# Patient Record
Sex: FEMALE | Race: WHITE | Employment: FULL TIME | URBAN - METROPOLITAN AREA
[De-identification: names, ages, dates, MRNs, and addresses within clinical notes are randomized per-mention and may not be internally consistent; named-entity substitution may affect disease eponyms.]

---

## 2021-04-04 ENCOUNTER — HOSPITAL ENCOUNTER (EMERGENCY)
Facility: CLINIC | Age: 61
Discharge: ANOTHER HOSPITAL | End: 2021-04-04
Attending: FAMILY MEDICINE | Admitting: FAMILY MEDICINE
Payer: COMMERCIAL

## 2021-04-04 ENCOUNTER — HOSPITAL ENCOUNTER (INPATIENT)
Facility: CLINIC | Age: 61
LOS: 3 days | Discharge: HOME | DRG: 247 | End: 2021-04-07
Admitting: INTERNAL MEDICINE
Payer: COMMERCIAL

## 2021-04-04 VITALS
WEIGHT: 180 LBS | DIASTOLIC BLOOD PRESSURE: 78 MMHG | BODY MASS INDEX: 28.93 KG/M2 | TEMPERATURE: 97.4 F | RESPIRATION RATE: 23 BRPM | HEIGHT: 66 IN | SYSTOLIC BLOOD PRESSURE: 138 MMHG | OXYGEN SATURATION: 98 % | HEART RATE: 97 BPM

## 2021-04-04 DIAGNOSIS — I21.02 ST ELEVATION MYOCARDIAL INFARCTION INVOLVING LEFT ANTERIOR DESCENDING (LAD) CORONARY ARTERY (H): Primary | ICD-10-CM

## 2021-04-04 DIAGNOSIS — I21.3 ST ELEVATION MI (STEMI) (H): ICD-10-CM

## 2021-04-04 DIAGNOSIS — I50.20 HEART FAILURE WITH REDUCED EJECTION FRACTION, NYHA CLASS I (H): ICD-10-CM

## 2021-04-04 DIAGNOSIS — I25.118 CORONARY ARTERY DISEASE OF NATIVE ARTERY OF NATIVE HEART WITH STABLE ANGINA PECTORIS (H): ICD-10-CM

## 2021-04-04 DIAGNOSIS — I10 ESSENTIAL HYPERTENSION: Chronic | ICD-10-CM

## 2021-04-04 DIAGNOSIS — D62 ANEMIA DUE TO BLOOD LOSS, ACUTE: ICD-10-CM

## 2021-04-04 DIAGNOSIS — Z11.52 ENCOUNTER FOR SCREENING LABORATORY TESTING FOR SEVERE ACUTE RESPIRATORY SYNDROME CORONAVIRUS 2 (SARS-COV-2): ICD-10-CM

## 2021-04-04 DIAGNOSIS — E78.5 HYPERLIPIDEMIA, UNSPECIFIED HYPERLIPIDEMIA TYPE: Chronic | ICD-10-CM

## 2021-04-04 DIAGNOSIS — I21.3 ST ELEVATION MYOCARDIAL INFARCTION (STEMI), UNSPECIFIED ARTERY (H): ICD-10-CM

## 2021-04-04 LAB
ANION GAP SERPL CALCULATED.3IONS-SCNC: 11 MMOL/L (ref 3–14)
APTT PPP: >240 SEC (ref 22–37)
BASOPHILS # BLD AUTO: 0 10E9/L (ref 0–0.2)
BASOPHILS NFR BLD AUTO: 0.3 %
BUN SERPL-MCNC: 16 MG/DL (ref 7–30)
CALCIUM SERPL-MCNC: 9.2 MG/DL (ref 8.5–10.1)
CHLORIDE SERPL-SCNC: 97 MMOL/L (ref 94–109)
CHOLEST SERPL-MCNC: 224 MG/DL
CO2 SERPL-SCNC: 22 MMOL/L (ref 20–32)
CREAT SERPL-MCNC: 0.7 MG/DL (ref 0.52–1.04)
DIFFERENTIAL METHOD BLD: ABNORMAL
EOSINOPHIL # BLD AUTO: 0.1 10E9/L (ref 0–0.7)
EOSINOPHIL NFR BLD AUTO: 0.5 %
ERYTHROCYTE [DISTWIDTH] IN BLOOD BY AUTOMATED COUNT: 12.4 % (ref 10–15)
GFR SERPL CREATININE-BSD FRML MDRD: >90 ML/MIN/{1.73_M2}
GLUCOSE BLD-MCNC: 286 MG/DL (ref 70–99)
GLUCOSE BLDC GLUCOMTR-MCNC: 276 MG/DL (ref 70–99)
GLUCOSE BLDC GLUCOMTR-MCNC: 315 MG/DL (ref 70–99)
GLUCOSE SERPL-MCNC: 306 MG/DL (ref 70–99)
HBA1C MFR BLD: 10.9 % (ref 0–5.6)
HCT VFR BLD AUTO: 41.5 % (ref 35–47)
HDLC SERPL-MCNC: 37 MG/DL
HGB BLD-MCNC: 14.7 G/DL (ref 11.7–15.7)
IMM GRANULOCYTES # BLD: 0.1 10E9/L (ref 0–0.4)
IMM GRANULOCYTES NFR BLD: 0.4 %
INR PPP: 1.17 (ref 0.86–1.14)
KCT BLD-ACNC: 172 SEC (ref 75–150)
LABORATORY COMMENT REPORT: NORMAL
LACTATE BLD-SCNC: 1.4 MMOL/L (ref 0.7–2)
LDLC SERPL CALC-MCNC: 173 MG/DL
LYMPHOCYTES # BLD AUTO: 2 10E9/L (ref 0.8–5.3)
LYMPHOCYTES NFR BLD AUTO: 17 %
MCH RBC QN AUTO: 31.5 PG (ref 26.5–33)
MCHC RBC AUTO-ENTMCNC: 35.4 G/DL (ref 31.5–36.5)
MCV RBC AUTO: 89 FL (ref 78–100)
MONOCYTES # BLD AUTO: 0.7 10E9/L (ref 0–1.3)
MONOCYTES NFR BLD AUTO: 5.6 %
NEUTROPHILS # BLD AUTO: 8.9 10E9/L (ref 1.6–8.3)
NEUTROPHILS NFR BLD AUTO: 76.2 %
NONHDLC SERPL-MCNC: 187 MG/DL
NRBC # BLD AUTO: 0 10*3/UL
NRBC BLD AUTO-RTO: 0 /100
PLATELET # BLD AUTO: 281 10E9/L (ref 150–450)
POTASSIUM SERPL-SCNC: 4.8 MMOL/L (ref 3.4–5.3)
RBC # BLD AUTO: 4.66 10E12/L (ref 3.8–5.2)
SARS-COV-2 RNA RESP QL NAA+PROBE: NEGATIVE
SODIUM SERPL-SCNC: 130 MMOL/L (ref 133–144)
SPECIMEN SOURCE: NORMAL
TRIGL SERPL-MCNC: 74 MG/DL
TROPONIN I SERPL-MCNC: 102.46 UG/L (ref 0–0.04)
TROPONIN I SERPL-MCNC: 112.73 UG/L (ref 0–0.04)
TROPONIN I SERPL-MCNC: 19.77 UG/L (ref 0–0.04)
WBC # BLD AUTO: 11.6 10E9/L (ref 4–11)

## 2021-04-04 PROCEDURE — 93010 ELECTROCARDIOGRAM REPORT: CPT | Performed by: INTERNAL MEDICINE

## 2021-04-04 PROCEDURE — 999N001017 HC STATISTIC GLUCOSE BY METER IP

## 2021-04-04 PROCEDURE — 99152 MOD SED SAME PHYS/QHP 5/>YRS: CPT | Performed by: INTERNAL MEDICINE

## 2021-04-04 PROCEDURE — 250N000012 HC RX MED GY IP 250 OP 636 PS 637: Performed by: PHYSICIAN ASSISTANT

## 2021-04-04 PROCEDURE — C1887 CATHETER, GUIDING: HCPCS | Performed by: INTERNAL MEDICINE

## 2021-04-04 PROCEDURE — 87635 SARS-COV-2 COVID-19 AMP PRB: CPT | Performed by: FAMILY MEDICINE

## 2021-04-04 PROCEDURE — 85025 COMPLETE CBC W/AUTO DIFF WBC: CPT | Performed by: FAMILY MEDICINE

## 2021-04-04 PROCEDURE — C1760 CLOSURE DEV, VASC: HCPCS | Performed by: INTERNAL MEDICINE

## 2021-04-04 PROCEDURE — 93454 CORONARY ARTERY ANGIO S&I: CPT | Performed by: INTERNAL MEDICINE

## 2021-04-04 PROCEDURE — 99221 1ST HOSP IP/OBS SF/LOW 40: CPT | Mod: 25 | Performed by: PHYSICIAN ASSISTANT

## 2021-04-04 PROCEDURE — 250N000011 HC RX IP 250 OP 636: Performed by: INTERNAL MEDICINE

## 2021-04-04 PROCEDURE — C1769 GUIDE WIRE: HCPCS | Performed by: INTERNAL MEDICINE

## 2021-04-04 PROCEDURE — 93005 ELECTROCARDIOGRAM TRACING: CPT | Performed by: FAMILY MEDICINE

## 2021-04-04 PROCEDURE — 83605 ASSAY OF LACTIC ACID: CPT

## 2021-04-04 PROCEDURE — 93005 ELECTROCARDIOGRAM TRACING: CPT

## 2021-04-04 PROCEDURE — 96374 THER/PROPH/DIAG INJ IV PUSH: CPT | Performed by: FAMILY MEDICINE

## 2021-04-04 PROCEDURE — 99291 CRITICAL CARE FIRST HOUR: CPT | Mod: 25 | Performed by: FAMILY MEDICINE

## 2021-04-04 PROCEDURE — 85610 PROTHROMBIN TIME: CPT | Performed by: FAMILY MEDICINE

## 2021-04-04 PROCEDURE — 80061 LIPID PANEL: CPT | Performed by: PHYSICIAN ASSISTANT

## 2021-04-04 PROCEDURE — 027034Z DILATION OF CORONARY ARTERY, ONE ARTERY WITH DRUG-ELUTING INTRALUMINAL DEVICE, PERCUTANEOUS APPROACH: ICD-10-PCS | Performed by: INTERNAL MEDICINE

## 2021-04-04 PROCEDURE — 250N000013 HC RX MED GY IP 250 OP 250 PS 637: Performed by: FAMILY MEDICINE

## 2021-04-04 PROCEDURE — 250N000009 HC RX 250: Performed by: INTERNAL MEDICINE

## 2021-04-04 PROCEDURE — 85730 THROMBOPLASTIN TIME PARTIAL: CPT | Performed by: FAMILY MEDICINE

## 2021-04-04 PROCEDURE — B2111ZZ FLUOROSCOPY OF MULTIPLE CORONARY ARTERIES USING LOW OSMOLAR CONTRAST: ICD-10-PCS | Performed by: INTERNAL MEDICINE

## 2021-04-04 PROCEDURE — 84484 ASSAY OF TROPONIN QUANT: CPT | Performed by: PHYSICIAN ASSISTANT

## 2021-04-04 PROCEDURE — 36415 COLL VENOUS BLD VENIPUNCTURE: CPT | Performed by: PHYSICIAN ASSISTANT

## 2021-04-04 PROCEDURE — 250N000013 HC RX MED GY IP 250 OP 250 PS 637: Performed by: PHYSICIAN ASSISTANT

## 2021-04-04 PROCEDURE — 99153 MOD SED SAME PHYS/QHP EA: CPT | Performed by: INTERNAL MEDICINE

## 2021-04-04 PROCEDURE — 80048 BASIC METABOLIC PNL TOTAL CA: CPT | Performed by: FAMILY MEDICINE

## 2021-04-04 PROCEDURE — 93010 ELECTROCARDIOGRAM REPORT: CPT | Performed by: FAMILY MEDICINE

## 2021-04-04 PROCEDURE — 272N000001 HC OR GENERAL SUPPLY STERILE: Performed by: INTERNAL MEDICINE

## 2021-04-04 PROCEDURE — 85347 COAGULATION TIME ACTIVATED: CPT

## 2021-04-04 PROCEDURE — 82947 ASSAY GLUCOSE BLOOD QUANT: CPT

## 2021-04-04 PROCEDURE — 83036 HEMOGLOBIN GLYCOSYLATED A1C: CPT | Performed by: PHYSICIAN ASSISTANT

## 2021-04-04 PROCEDURE — C9803 HOPD COVID-19 SPEC COLLECT: HCPCS | Performed by: FAMILY MEDICINE

## 2021-04-04 PROCEDURE — 250N000011 HC RX IP 250 OP 636: Performed by: FAMILY MEDICINE

## 2021-04-04 PROCEDURE — C1757 CATH, THROMBECTOMY/EMBOLECT: HCPCS | Performed by: INTERNAL MEDICINE

## 2021-04-04 PROCEDURE — C9606 PERC D-E COR REVASC W AMI S: HCPCS | Mod: LD | Performed by: INTERNAL MEDICINE

## 2021-04-04 PROCEDURE — 250N000011 HC RX IP 250 OP 636: Performed by: PHYSICIAN ASSISTANT

## 2021-04-04 PROCEDURE — C1894 INTRO/SHEATH, NON-LASER: HCPCS | Performed by: INTERNAL MEDICINE

## 2021-04-04 PROCEDURE — C1725 CATH, TRANSLUMIN NON-LASER: HCPCS | Performed by: INTERNAL MEDICINE

## 2021-04-04 PROCEDURE — 84484 ASSAY OF TROPONIN QUANT: CPT | Performed by: FAMILY MEDICINE

## 2021-04-04 PROCEDURE — 99285 EMERGENCY DEPT VISIT HI MDM: CPT | Mod: 25 | Performed by: FAMILY MEDICINE

## 2021-04-04 PROCEDURE — 200N000002 HC R&B ICU UMMC

## 2021-04-04 PROCEDURE — C1874 STENT, COATED/COV W/DEL SYS: HCPCS | Performed by: INTERNAL MEDICINE

## 2021-04-04 DEVICE — STENT SYNERGY DRUG ELUTING 2.75X28MM  H7493926028270: Type: IMPLANTABLE DEVICE | Status: FUNCTIONAL

## 2021-04-04 RX ORDER — LIDOCAINE 40 MG/G
CREAM TOPICAL
Status: DISCONTINUED | OUTPATIENT
Start: 2021-04-04 | End: 2021-04-07 | Stop reason: HOSPADM

## 2021-04-04 RX ORDER — LOSARTAN POTASSIUM 100 MG/1
100 TABLET ORAL DAILY
Status: ON HOLD | COMMUNITY
Start: 2020-09-12 | End: 2021-04-07

## 2021-04-04 RX ORDER — HYDROCHLOROTHIAZIDE 25 MG/1
25 TABLET ORAL DAILY
Status: ON HOLD | COMMUNITY
Start: 2020-09-12 | End: 2021-04-07

## 2021-04-04 RX ORDER — FENTANYL CITRATE 50 UG/ML
25-50 INJECTION, SOLUTION INTRAMUSCULAR; INTRAVENOUS
Status: ACTIVE | OUTPATIENT
Start: 2021-04-04 | End: 2021-04-04

## 2021-04-04 RX ORDER — ACETAMINOPHEN 325 MG/1
650 TABLET ORAL EVERY 4 HOURS PRN
Status: DISCONTINUED | OUTPATIENT
Start: 2021-04-04 | End: 2021-04-07 | Stop reason: HOSPADM

## 2021-04-04 RX ORDER — NALOXONE HYDROCHLORIDE 0.4 MG/ML
0.2 INJECTION, SOLUTION INTRAMUSCULAR; INTRAVENOUS; SUBCUTANEOUS
Status: DISCONTINUED | OUTPATIENT
Start: 2021-04-04 | End: 2021-04-07 | Stop reason: HOSPADM

## 2021-04-04 RX ORDER — ATORVASTATIN CALCIUM 40 MG/1
40 TABLET, FILM COATED ORAL EVERY EVENING
Status: DISCONTINUED | OUTPATIENT
Start: 2021-04-04 | End: 2021-04-07 | Stop reason: HOSPADM

## 2021-04-04 RX ORDER — ONDANSETRON 4 MG/1
4 TABLET, ORALLY DISINTEGRATING ORAL EVERY 6 HOURS PRN
Status: DISCONTINUED | OUTPATIENT
Start: 2021-04-04 | End: 2021-04-04

## 2021-04-04 RX ORDER — METOPROLOL TARTRATE 1 MG/ML
5-10 INJECTION, SOLUTION INTRAVENOUS
Status: DISCONTINUED | OUTPATIENT
Start: 2021-04-04 | End: 2021-04-05

## 2021-04-04 RX ORDER — HYDROCHLOROTHIAZIDE 25 MG/1
25 TABLET ORAL DAILY
Status: DISCONTINUED | OUTPATIENT
Start: 2021-04-05 | End: 2021-04-05

## 2021-04-04 RX ORDER — DEXTROSE MONOHYDRATE 25 G/50ML
25-50 INJECTION, SOLUTION INTRAVENOUS
Status: DISCONTINUED | OUTPATIENT
Start: 2021-04-04 | End: 2021-04-07 | Stop reason: HOSPADM

## 2021-04-04 RX ORDER — HEPARIN SODIUM 1000 [USP'U]/ML
INJECTION, SOLUTION INTRAVENOUS; SUBCUTANEOUS
Status: DISCONTINUED | OUTPATIENT
Start: 2021-04-04 | End: 2021-04-04 | Stop reason: HOSPADM

## 2021-04-04 RX ORDER — ASPIRIN 81 MG/1
81 TABLET, CHEWABLE ORAL ONCE
Status: DISCONTINUED | OUTPATIENT
Start: 2021-04-04 | End: 2021-04-04

## 2021-04-04 RX ORDER — ASPIRIN 81 MG/1
324 TABLET, CHEWABLE ORAL ONCE
Status: COMPLETED | OUTPATIENT
Start: 2021-04-04 | End: 2021-04-04

## 2021-04-04 RX ORDER — HYDRALAZINE HYDROCHLORIDE 20 MG/ML
10 INJECTION INTRAMUSCULAR; INTRAVENOUS EVERY 4 HOURS PRN
Status: DISCONTINUED | OUTPATIENT
Start: 2021-04-04 | End: 2021-04-04

## 2021-04-04 RX ORDER — INSULIN GLARGINE 100 [IU]/ML
18 INJECTION, SOLUTION SUBCUTANEOUS AT BEDTIME
COMMUNITY
Start: 2020-11-19

## 2021-04-04 RX ORDER — FLUMAZENIL 0.1 MG/ML
0.2 INJECTION, SOLUTION INTRAVENOUS
Status: DISCONTINUED | OUTPATIENT
Start: 2021-04-04 | End: 2021-04-05

## 2021-04-04 RX ORDER — FENTANYL CITRATE 50 UG/ML
INJECTION, SOLUTION INTRAMUSCULAR; INTRAVENOUS
Status: DISCONTINUED | OUTPATIENT
Start: 2021-04-04 | End: 2021-04-04 | Stop reason: HOSPADM

## 2021-04-04 RX ORDER — FENTANYL CITRATE-0.9 % NACL/PF 10 MCG/ML
PLASTIC BAG, INJECTION (ML) INTRAVENOUS
Status: DISCONTINUED | OUTPATIENT
Start: 2021-04-04 | End: 2021-04-04 | Stop reason: HOSPADM

## 2021-04-04 RX ORDER — NALOXONE HYDROCHLORIDE 0.4 MG/ML
0.4 INJECTION, SOLUTION INTRAMUSCULAR; INTRAVENOUS; SUBCUTANEOUS
Status: DISCONTINUED | OUTPATIENT
Start: 2021-04-04 | End: 2021-04-07 | Stop reason: HOSPADM

## 2021-04-04 RX ORDER — ONDANSETRON 2 MG/ML
4 INJECTION INTRAMUSCULAR; INTRAVENOUS EVERY 6 HOURS PRN
Status: DISCONTINUED | OUTPATIENT
Start: 2021-04-04 | End: 2021-04-07 | Stop reason: HOSPADM

## 2021-04-04 RX ORDER — IOPAMIDOL 755 MG/ML
INJECTION, SOLUTION INTRAVASCULAR
Status: DISCONTINUED | OUTPATIENT
Start: 2021-04-04 | End: 2021-04-04 | Stop reason: HOSPADM

## 2021-04-04 RX ORDER — NICARDIPINE HYDROCHLORIDE 2.5 MG/ML
INJECTION INTRAVENOUS
Status: DISCONTINUED | OUTPATIENT
Start: 2021-04-04 | End: 2021-04-04 | Stop reason: HOSPADM

## 2021-04-04 RX ORDER — SODIUM CHLORIDE 9 MG/ML
INJECTION, SOLUTION INTRAVENOUS CONTINUOUS
Status: ACTIVE | OUTPATIENT
Start: 2021-04-04 | End: 2021-04-04

## 2021-04-04 RX ORDER — HYDRALAZINE HYDROCHLORIDE 20 MG/ML
5 INJECTION INTRAMUSCULAR; INTRAVENOUS EVERY 4 HOURS PRN
Status: DISCONTINUED | OUTPATIENT
Start: 2021-04-04 | End: 2021-04-05

## 2021-04-04 RX ORDER — NITROGLYCERIN 0.4 MG/1
0.4 TABLET SUBLINGUAL EVERY 5 MIN PRN
Status: DISCONTINUED | OUTPATIENT
Start: 2021-04-04 | End: 2021-04-07 | Stop reason: HOSPADM

## 2021-04-04 RX ORDER — ATROPINE SULFATE 0.1 MG/ML
0.5 INJECTION INTRAVENOUS
Status: DISCONTINUED | OUTPATIENT
Start: 2021-04-04 | End: 2021-04-05

## 2021-04-04 RX ORDER — PRAVASTATIN SODIUM 80 MG/1
80 TABLET ORAL DAILY
Status: ON HOLD | COMMUNITY
Start: 2019-12-31 | End: 2021-04-05

## 2021-04-04 RX ORDER — ONDANSETRON 2 MG/ML
4 INJECTION INTRAMUSCULAR; INTRAVENOUS EVERY 6 HOURS PRN
Status: DISCONTINUED | OUTPATIENT
Start: 2021-04-04 | End: 2021-04-04

## 2021-04-04 RX ORDER — ONDANSETRON 4 MG/1
4 TABLET, ORALLY DISINTEGRATING ORAL EVERY 6 HOURS PRN
Status: DISCONTINUED | OUTPATIENT
Start: 2021-04-04 | End: 2021-04-07 | Stop reason: HOSPADM

## 2021-04-04 RX ORDER — ASPIRIN 81 MG/1
81 TABLET ORAL DAILY
Status: DISCONTINUED | OUTPATIENT
Start: 2021-04-05 | End: 2021-04-07 | Stop reason: HOSPADM

## 2021-04-04 RX ORDER — NICOTINE POLACRILEX 4 MG
15-30 LOZENGE BUCCAL
Status: DISCONTINUED | OUTPATIENT
Start: 2021-04-04 | End: 2021-04-07 | Stop reason: HOSPADM

## 2021-04-04 RX ADMIN — HEPARIN SODIUM 5500 UNITS: 1000 INJECTION INTRAVENOUS; SUBCUTANEOUS at 13:20

## 2021-04-04 RX ADMIN — INSULIN ASPART 6 UNITS: 100 INJECTION, SOLUTION INTRAVENOUS; SUBCUTANEOUS at 17:31

## 2021-04-04 RX ADMIN — ONDANSETRON 4 MG: 2 INJECTION INTRAMUSCULAR; INTRAVENOUS at 15:32

## 2021-04-04 RX ADMIN — ATORVASTATIN CALCIUM 40 MG: 40 TABLET, FILM COATED ORAL at 20:04

## 2021-04-04 RX ADMIN — Medication 12.5 MG: at 20:05

## 2021-04-04 RX ADMIN — INSULIN GLARGINE 9 UNITS: 100 INJECTION, SOLUTION SUBCUTANEOUS at 22:17

## 2021-04-04 RX ADMIN — TICAGRELOR 180 MG: 90 TABLET ORAL at 13:19

## 2021-04-04 RX ADMIN — HEPARIN SODIUM 5500 UNITS: 1000 INJECTION INTRAVENOUS; SUBCUTANEOUS at 13:24

## 2021-04-04 RX ADMIN — ACETAMINOPHEN 650 MG: 325 TABLET, FILM COATED ORAL at 22:14

## 2021-04-04 RX ADMIN — ASPIRIN 81 MG CHEWABLE TABLET 324 MG: 81 TABLET CHEWABLE at 13:18

## 2021-04-04 RX ADMIN — TICAGRELOR 90 MG: 90 TABLET ORAL at 20:05

## 2021-04-04 ASSESSMENT — ACTIVITIES OF DAILY LIVING (ADL)
WEAR_GLASSES_OR_BLIND: YES
ADLS_ACUITY_SCORE: 16
ADLS_ACUITY_SCORE: 16

## 2021-04-04 ASSESSMENT — MIFFLIN-ST. JEOR: SCORE: 1388.22

## 2021-04-04 NOTE — Clinical Note
Stent deployed in the middle left anterior descending. Max pressure = 12 vianca. Total duration = 2 seconds.

## 2021-04-04 NOTE — H&P
[facility]   History and Physical  Cardiology - [facility]       Date of Admission:  4/4/2021  Date of Service: 4/4/2021     Primary Care Physician   No Ref-Primary, Physician None    Assessment & Plan   Nighat Reyes is a 61 year old female with PMH significant for PDA (fixed surgically), T2DM, HTN, HLD who now presents with STEMI.    # Late presentation MI  Chest pain onset 4/3.  Episode of nausea 4/4 prompting ED visit.  Upon arrival, found to have ST segment elevation of leads V2-V6.  STEMI activation called for [facility]. Upon arrival to [facility], patient was chest pain free.  Coronary angiogram preformed which showed occlusion of her LAD. She has residual disease of her mRCA.  - cardiac rehab referral placed  - bedrest per protocol  - goal directed medical therapy   - DAPT: 81mg ASA, 90mg Brilinta BID   - statin: stop pravastatin, start 40mg atorvastatin daily   - BB: start 12.5mg metoprolol BID   - ACE/ARB: intolerant of lisinopril. Hold PTA losartan given recent contrast, 100mg daily. Start in the coming days  - will need mRCA intervention prior to discharge.    # HTN  BP, controlled  - continue HCTZ  - hold PTA losartan given contrast dye  - start BB as above  - PRN hydralazine ordered with parameters    # HLD  - statin as above    # T2DM  Outpatient regimen: 100mg januvia BID, 18u basalar daily, 1000mg metformin BID  - give half dose lantus this evening given poor PO status with recent coronary angiogram; 9u this evening  - high sliding scale insulin with hyper/hypoglycemia protocol  - hold PTA metformin  - hold PTA januvia    # Hx of PDA s/p surgical repair    F: not indicated  E: BMP s/p coronary angiogram  N: advance as tolerated s/p angiogram  DVT Prophylaxis: hold Lovenox today. SCDs today.  possible Lovenox tomorrow    Code Status: Full Code    Disposition: Anticipate discharge in 2-3 days. Appropriate for inpatient  Care.    Case discussed with Dr. Goldberg  "Erica.  Assessment and plan as written above.    Ignacia Zarate PA-C  [facility] Cardiology    History is obtained from the patient and review of the EMR.    Past Medical History    See above    Past Surgical History   Non-contributory    Family History    Non-contributory.    History of Present Illness   Nighat Reyes is a 61 year old female with PMH significant for PDA (fixed surgically), T2DM, HTN, HLD who now presents with STEMI.    Patient is a 61-year-old female who at approximately 1 PM yesterday afternoon began to note onset of left sided/central chest pain.  She describes this as a squeezing pain that nothing seemed to make this worse.  The patient felt that the pain had mildly improved after rest.  The patient notes that with onset of pain, its intensity was approximately a 7 out of 10.  She notes that throughout the day the pain was not \"as severe\" the patient had an evening of poor sleep.  She awoke this morning and had persistent chest pain.  At approximately 11 AM, while with her family for [employer] lunch, she had 1 episode of emesis.  She discussed her recent symptoms with her daughter-in-law whom is a registered nurse.  She urged the patient to present to the emergency department for evaluation of acute coronary syndrome.    Upon arrival to [facility], the patient underwent immediate EKG.  She was found to have ST segment elevation of V2 through V6.  STEMI activation was called.  The patient was emergently transferred to [facility].  She underwent coronary angiogram which showed 100% occlusion of her proximal LAD.  This was opened and a stent was placed.  The patient has residual disease of her right coronary artery that will require intervention.  At present, she denies chest pain, shortness of breath, right groin pain, and lightheadedness/dizziness.  She has mild nausea.      ROS: In the last two weeks, the patient otherwise denies fever, chills, cold-like symptoms (congestion, pharyngitis, " sinus pressure, rhinorrhea), headaches, lightheadedness, dizziness, palpitations/flutters, SOB, wheezes, abdominal pain, diarrhea/constipation, dysuria, hematuria, joint swelling, joint pain, leg swelling, and rashes. The patient has had a mild cough over the last several week which she attributes to allergies.      Prior to Admission Medications   Prior to Admission Medications   Prescriptions Last Dose Informant Patient Reported? Taking?   hydrochlorothiazide (HYDRODIURIL) 25 MG tablet   Yes Yes   Sig: Take 25 mg by mouth daily   insulin glargine (BASAGLAR KWIKPEN) 100 UNIT/ML pen   Yes Yes   Sig: Inject 18 Units Subcutaneous At Bedtime   losartan (COZAAR) 100 MG tablet   Yes Yes   Sig: Take 100 mg by mouth daily   metFORMIN (GLUCOPHAGE) 1000 MG tablet   Yes Yes   Sig: Take 1,000 mg by mouth 2 times daily (with meals)   pravastatin (PRAVACHOL) 80 MG tablet   Yes Yes   Sig: Take 80 mg by mouth daily   sitagliptin (JANUVIA) 100 MG tablet   Yes Yes   Sig: Take 100 mg by mouth daily      Facility-Administered Medications: None       Allergies   Allergies   Allergen Reactions     Lisinopril        Social History   Social History     Socioeconomic History     Marital status:      Spouse name: Not on file     Number of children: Not on file     Years of education: Not on file     Highest education level: Not on file   Occupational History     Not on file   Social Needs     Financial resource strain: Not on file     Food insecurity     Worry: Not on file     Inability: Not on file     Transportation needs     Medical: Not on file     Non-medical: Not on file   Tobacco Use     Smoking status: Not on file   Substance and Sexual Activity     Alcohol use: Not on file     Drug use: Not on file     Sexual activity: Not on file   Lifestyle     Physical activity     Days per week: Not on file     Minutes per session: Not on file     Stress: Not on file   Relationships     Social connections     Talks on phone: Not on file      Gets together: Not on file     Attends [employer] service: Not on file     Active member of club or organization: Not on file     Attends meetings of clubs or organizations: Not on file     Relationship status: Not on file     Intimate partner violence     Fear of current or ex partner: Not on file     Emotionally abused: Not on file     Physically abused: Not on file     Forced sexual activity: Not on file   Other Topics Concern     Not on file   Social History Narrative     Not on file     Physical Exam   Resp 18    Weight: 0 lbs 0 oz There is no height or weight on file to calculate BMI.     EKG:      ECHO:  Pending    Procedure:  Coronary angiogram final read pending.  100% occlusion of pLAD.    Constitutional: Alert and oriented x4.  Cooperative.  Appears stated age.  Appears in no caute distress.   HEENT: Oropharynx is clear and moist. No evidence of cranial trauma.  Lymph/Hematologic: No occipital, submental, submandibular, anterior or posterior cervical, or supraclavicular lymphadenopathy is appreciated.  Cardiovascular: Regular rate/ rhythm.  S1 and S2 grossly normal.  No appreciable murmur, rub, gallop.  JVP is not elevated.  No lower extremity edema.  Right Groin: clean, dry, intact. No appreciable hematoma  Respiratory: Clear to auscultation bilaterally. Equal chest expansion.  GI: Soft, non-tender, normal bowel sounds, no hepatosplenomegaly.  Genitourinary: Deferred  Musculoskeletal: Normal muscle bulk and tone.  Skin: Warm and dry, no rashes.   Neurologic: Neck supple. Cranial nerves 3-12 are grossly intact.  is symmetric.     Data   Recent Labs   Lab 04/04/21  1433 04/04/21  1335 04/04/21  1317   WBC  --   --  11.6*   HGB  --   --  14.7   MCV  --   --  89   PLT  --   --  281   INR  --  1.17*  --    NA  --   --  130*   POTASSIUM  --   --  4.8   CHLORIDE  --   --  97   CO2  --   --  22   BUN  --   --  16   CR  --   --  0.70   ANIONGAP  --   --  11   LEYLA  --   --  9.2   *  --  306*   TROPI   --   --  19.858*     Ignacia Zarate PA-C  Cardiology - [facility]       I have seen and examined the patient with the [facility] team. I agree with the assessment and plan of the note above.I have reviewed pertinent labs.     Yusuf Maldonado MD  Interventional Cardiology  Pager: [phone]

## 2021-04-04 NOTE — PLAN OF CARE
Admitted/transferred from: Cath lab  Reason for admission/transfer: Post PCI  2 RN skin assessment: completed by Bernice Vance and Tequila Zamora  Result of skin assessment and interventions/actions: Encouraged mobility  Height, weight, drug calc weight: done  Patient belongings: with   MDRO education added to care plan done  ?     Major Shift Events: Arrived to unit at 1500 on 2L NC. Left groin site soft, no hematoma, no bleeding. Off bedrest at 1700; pt got up to toilet to void, tolerating well and now in chair.    Plan: Monitor closely and continue plan of care.    For vital signs and complete assessments, please see documentation flowsheets.

## 2021-04-04 NOTE — ED PROVIDER NOTES
HPI   The patient is a 61-year-old female presenting with chest pain.  She has a known history of patent ductus arteriosus which was fixed surgically a number of years ago.  She denies other cardiac pathology.  She denies pulmonary pathology.  She does smoke.  No chewing tobacco.  She drinks alcohol about 3 beers once a week.  No drugs of abuse.    The patient reports onset of pain since yesterday.  She has had intermittent episodes of pain since that time.  It is felt in the left chest.  Today her pain started as she was waking up at about 8:30 AM.  Her pain is been constant today.  She rates the pain 3/10.  She points toward her left chest.  She denies radiating symptoms.  She denies diaphoresis and shortness of breath.  She does report nausea and throwing up about an hour ago.  No leg pain or swelling.  No cough or congestion.  No fever.  She has not had pain like this previously.        Allergies:  Allergies   Allergen Reactions     Lisinopril      Problem List:    Patient Active Problem List    Diagnosis Date Noted     ST elevation myocardial infarction involving left anterior descending (LAD) coronary artery (H) 04/04/2021     Priority: Medium     Diabetes mellitus, type II (H) 04/14/2006     Priority: Medium     Formatting of this note might be different from the original.  Diagnosed age mid 40s.       Essential hypertension 04/14/2006     Priority: Medium     Hyperlipidemia 04/14/2006     Priority: Medium      Past Medical History:    No past medical history on file.  Past Surgical History:    No past surgical history on file.  Family History:    No family history on file.  Social History:  Marital Status:   [2]  Social History     Tobacco Use     Smoking status: Not on file   Substance Use Topics     Alcohol use: Not on file     Drug use: Not on file      Medications:    No current outpatient medications on file.    Review of Systems   All other systems reviewed and are negative.      PE   BP: (!)  "147/99  Pulse: 105  Temp: 97.4  F (36.3  C)  Resp: 18  Height: 167.6 cm (5' 6\")  Weight: 81.6 kg (180 lb)  SpO2: 99 %  Physical Exam  Vitals signs reviewed.   Constitutional:       General: She is not in acute distress.     Appearance: She is well-developed.      Comments: Cooperative, no apparent distress.  Answering questions well.   HENT:      Head: Normocephalic and atraumatic.      Right Ear: External ear normal.      Left Ear: External ear normal.      Nose: Nose normal.      Mouth/Throat:      Mouth: Mucous membranes are moist.      Pharynx: Oropharynx is clear.   Eyes:      Extraocular Movements: Extraocular movements intact.      Conjunctiva/sclera: Conjunctivae normal.      Pupils: Pupils are equal, round, and reactive to light.   Neck:      Musculoskeletal: Normal range of motion.   Cardiovascular:      Rate and Rhythm: Normal rate and regular rhythm.      Heart sounds: Normal heart sounds.   Pulmonary:      Effort: Pulmonary effort is normal.      Breath sounds: Normal breath sounds.   Abdominal:      Palpations: Abdomen is soft.      Tenderness: There is no abdominal tenderness.   Musculoskeletal: Normal range of motion.   Skin:     General: Skin is warm and dry.   Neurological:      Mental Status: She is alert and oriented to person, place, and time.   Psychiatric:         Behavior: Behavior normal.         ED COURSE and [facility]   1322.  Patient presents with chest pain and recent nausea with vomiting.  Her EKG is concerning for an anterior lateral STEMI.  STEMI activation ordered.  Lab values pending.  I just spoke with  at the [facility].  He recommended using the STEMI protocol which has been ordered already.  No additional orders requested.  The patient will be transferred code 3.  Patient agrees with the plan.    EKG  (1309)   Interpretation performed by me.  Rate: 101     Rhythm: sinus     Axis: L  Intervals: TN (12-2) 132, QRS (<12) 128, QTc (>5) 443  P wave: down in V1.     " QRS complex: Incomplete right bundle branch block with left axis anterior fascicular block.  ST segment / T-wave: ST elevation 2 boxes in leads I and aVL, ST elevation 3 boxes in lead V2, 1 box in lead V3, half a box in lead V4 and V5.  Conclusion: STEMI, anterior lateral distribution likely.  Sinus tachycardia.  Possible left atrial enlargement.    LABS  Labs Ordered and Resulted from Time of ED Arrival Up to the Time of Departure from the ED   CBC WITH PLATELETS DIFFERENTIAL - Abnormal; Notable for the following components:       Result Value    WBC 11.6 (*)     Absolute Neutrophil 8.9 (*)     All other components within normal limits   INR   PARTIAL THROMBOPLASTIN TIME   BASIC METABOLIC PANEL   TROPONIN I   SARS-COV-2 (COVID-19) VIRUS RT-PCR   PULSE OXIMETRY NURSING   CARDIAC CONTINUOUS MONITORING   PERIPHERAL IV CATHETER   PATIENT CARE ORDER       IMAGING  Images reviewed by me.  Radiology report also reviewed.  No orders to display       Procedures    Medications   aspirin (ASA) chewable tablet 324 mg (324 mg Oral Given 4/4/21 1318)   ticagrelor (BRILINTA) tablet 180 mg (180 mg Oral Given 4/4/21 1319)   heparin ANTICOAGULANT (porcine) injection (LOADING DOSE) (5,500 Units Intravenous Given 4/4/21 1324)         IMPRESSION       ICD-10-CM    1. ST elevation myocardial infarction (STEMI), unspecified artery (H)  I21.3 CBC with platelets differential            Medication List      There are no discharge medications for this visit.               The patient has a STEMI     The patient was evaluated at 1320   Cath lab transfer at [facility] for cardiac intervention   ASA given in the ER    Critical Care Documentation  My initial assessment included review of prehospital provider report, review of nursing observations, review of vital signs, focused history, physical exam, review of cardiac rhythm monitor, 12 lead ECG analysis and discussion with Roger.  It was determined that the patient had a STEMI. This required  immediate intervention and critical care decision-making.     Critical care time: 72 minutes.                        Jak Sanchez MD  04/04/21 [id number]

## 2021-04-04 NOTE — Clinical Note
The first balloon was inserted into the left anterior descending and middle left anterior descending.Max pressure = 12 vianca. Total duration = 3 seconds.     Max pressure = 12 vianca. Total duration = 3 seconds.    Balloon reinflated a second time: Max pressure = 12 vianca. Total duration = 3 seconds.

## 2021-04-05 ENCOUNTER — APPOINTMENT (OUTPATIENT)
Dept: CT IMAGING | Facility: CLINIC | Age: 61
DRG: 247 | End: 2021-04-05
Attending: INTERNAL MEDICINE
Payer: COMMERCIAL

## 2021-04-05 ENCOUNTER — APPOINTMENT (OUTPATIENT)
Dept: CARDIOLOGY | Facility: CLINIC | Age: 61
DRG: 247 | End: 2021-04-05
Attending: PHYSICIAN ASSISTANT
Payer: COMMERCIAL

## 2021-04-05 ENCOUNTER — APPOINTMENT (OUTPATIENT)
Dept: CARDIOLOGY | Facility: CLINIC | Age: 61
DRG: 247 | End: 2021-04-05
Attending: INTERNAL MEDICINE
Payer: COMMERCIAL

## 2021-04-05 LAB
ANION GAP SERPL CALCULATED.3IONS-SCNC: 8 MMOL/L (ref 3–14)
APTT PPP: 28 SEC (ref 22–37)
BUN SERPL-MCNC: 21 MG/DL (ref 7–30)
CALCIUM SERPL-MCNC: 8.4 MG/DL (ref 8.5–10.1)
CHLORIDE SERPL-SCNC: 98 MMOL/L (ref 94–109)
CO2 SERPL-SCNC: 24 MMOL/L (ref 20–32)
CREAT SERPL-MCNC: 0.78 MG/DL (ref 0.52–1.04)
ERYTHROCYTE [DISTWIDTH] IN BLOOD BY AUTOMATED COUNT: 12.7 % (ref 10–15)
ERYTHROCYTE [DISTWIDTH] IN BLOOD BY AUTOMATED COUNT: 12.9 % (ref 10–15)
GFR SERPL CREATININE-BSD FRML MDRD: 81 ML/MIN/{1.73_M2}
GLUCOSE BLDC GLUCOMTR-MCNC: 194 MG/DL (ref 70–99)
GLUCOSE BLDC GLUCOMTR-MCNC: 198 MG/DL (ref 70–99)
GLUCOSE BLDC GLUCOMTR-MCNC: 219 MG/DL (ref 70–99)
GLUCOSE BLDC GLUCOMTR-MCNC: 250 MG/DL (ref 70–99)
GLUCOSE BLDC GLUCOMTR-MCNC: 305 MG/DL (ref 70–99)
GLUCOSE SERPL-MCNC: 208 MG/DL (ref 70–99)
HCT VFR BLD AUTO: 31.8 % (ref 35–47)
HCT VFR BLD AUTO: 34.4 % (ref 35–47)
HGB BLD-MCNC: 10.6 G/DL (ref 11.7–15.7)
HGB BLD-MCNC: 10.8 G/DL (ref 11.7–15.7)
HGB BLD-MCNC: 10.9 G/DL (ref 11.7–15.7)
HGB BLD-MCNC: 11.9 G/DL (ref 11.7–15.7)
INR PPP: 1.04 (ref 0.86–1.14)
INTERPRETATION ECG - MUSE: NORMAL
MCH RBC QN AUTO: 30 PG (ref 26.5–33)
MCH RBC QN AUTO: 31.4 PG (ref 26.5–33)
MCHC RBC AUTO-ENTMCNC: 34 G/DL (ref 31.5–36.5)
MCHC RBC AUTO-ENTMCNC: 34.6 G/DL (ref 31.5–36.5)
MCV RBC AUTO: 88 FL (ref 78–100)
MCV RBC AUTO: 91 FL (ref 78–100)
PLATELET # BLD AUTO: 211 10E9/L (ref 150–450)
PLATELET # BLD AUTO: 212 10E9/L (ref 150–450)
POTASSIUM SERPL-SCNC: 3.5 MMOL/L (ref 3.4–5.3)
POTASSIUM SERPL-SCNC: 3.9 MMOL/L (ref 3.4–5.3)
RBC # BLD AUTO: 3.6 10E12/L (ref 3.8–5.2)
RBC # BLD AUTO: 3.79 10E12/L (ref 3.8–5.2)
SODIUM SERPL-SCNC: 130 MMOL/L (ref 133–144)
TROPONIN I SERPL-MCNC: 41.15 UG/L (ref 0–0.04)
TROPONIN I SERPL-MCNC: 70.95 UG/L (ref 0–0.04)
UFH PPP CHRO-ACNC: 1.1 IU/ML
UFH PPP CHRO-ACNC: >1.1 IU/ML
WBC # BLD AUTO: 10.1 10E9/L (ref 4–11)
WBC # BLD AUTO: 9.5 10E9/L (ref 4–11)

## 2021-04-05 PROCEDURE — 85018 HEMOGLOBIN: CPT | Performed by: STUDENT IN AN ORGANIZED HEALTH CARE EDUCATION/TRAINING PROGRAM

## 2021-04-05 PROCEDURE — 74176 CT ABD & PELVIS W/O CONTRAST: CPT | Mod: 26

## 2021-04-05 PROCEDURE — 99233 SBSQ HOSP IP/OBS HIGH 50: CPT | Mod: 25 | Performed by: INTERNAL MEDICINE

## 2021-04-05 PROCEDURE — 93321 DOPPLER ECHO F-UP/LMTD STD: CPT

## 2021-04-05 PROCEDURE — 36415 COLL VENOUS BLD VENIPUNCTURE: CPT | Performed by: INTERNAL MEDICINE

## 2021-04-05 PROCEDURE — 74176 CT ABD & PELVIS W/O CONTRAST: CPT

## 2021-04-05 PROCEDURE — 120N000002 HC R&B MED SURG/OB UMMC

## 2021-04-05 PROCEDURE — 36415 COLL VENOUS BLD VENIPUNCTURE: CPT | Performed by: PHYSICIAN ASSISTANT

## 2021-04-05 PROCEDURE — 250N000011 HC RX IP 250 OP 636: Performed by: PHYSICIAN ASSISTANT

## 2021-04-05 PROCEDURE — 36415 COLL VENOUS BLD VENIPUNCTURE: CPT

## 2021-04-05 PROCEDURE — 85027 COMPLETE CBC AUTOMATED: CPT | Performed by: PHYSICIAN ASSISTANT

## 2021-04-05 PROCEDURE — 999N000208 ECHOCARDIOGRAM COMPLETE

## 2021-04-05 PROCEDURE — 93308 TTE F-UP OR LMTD: CPT | Mod: 26 | Performed by: INTERNAL MEDICINE

## 2021-04-05 PROCEDURE — 93306 TTE W/DOPPLER COMPLETE: CPT | Mod: 26 | Performed by: INTERNAL MEDICINE

## 2021-04-05 PROCEDURE — 84132 ASSAY OF SERUM POTASSIUM: CPT | Performed by: PHYSICIAN ASSISTANT

## 2021-04-05 PROCEDURE — 36415 COLL VENOUS BLD VENIPUNCTURE: CPT | Performed by: STUDENT IN AN ORGANIZED HEALTH CARE EDUCATION/TRAINING PROGRAM

## 2021-04-05 PROCEDURE — 99207 PR APP CREDIT; MD BILLING SHARED VISIT: CPT | Performed by: PHYSICIAN ASSISTANT

## 2021-04-05 PROCEDURE — 250N000013 HC RX MED GY IP 250 OP 250 PS 637

## 2021-04-05 PROCEDURE — 80048 BASIC METABOLIC PNL TOTAL CA: CPT | Performed by: PHYSICIAN ASSISTANT

## 2021-04-05 PROCEDURE — 93005 ELECTROCARDIOGRAM TRACING: CPT

## 2021-04-05 PROCEDURE — 999N001017 HC STATISTIC GLUCOSE BY METER IP

## 2021-04-05 PROCEDURE — 85520 HEPARIN ASSAY: CPT

## 2021-04-05 PROCEDURE — 258N000003 HC RX IP 258 OP 636: Performed by: INTERNAL MEDICINE

## 2021-04-05 PROCEDURE — 85730 THROMBOPLASTIN TIME PARTIAL: CPT | Performed by: PHYSICIAN ASSISTANT

## 2021-04-05 PROCEDURE — 85018 HEMOGLOBIN: CPT | Performed by: INTERNAL MEDICINE

## 2021-04-05 PROCEDURE — 85520 HEPARIN ASSAY: CPT | Performed by: PHYSICIAN ASSISTANT

## 2021-04-05 PROCEDURE — 255N000002 HC RX 255 OP 636: Performed by: INTERNAL MEDICINE

## 2021-04-05 PROCEDURE — 84484 ASSAY OF TROPONIN QUANT: CPT | Performed by: PHYSICIAN ASSISTANT

## 2021-04-05 PROCEDURE — 93010 ELECTROCARDIOGRAM REPORT: CPT | Mod: 76 | Performed by: INTERNAL MEDICINE

## 2021-04-05 PROCEDURE — 85610 PROTHROMBIN TIME: CPT

## 2021-04-05 PROCEDURE — 250N000013 HC RX MED GY IP 250 OP 250 PS 637: Performed by: PHYSICIAN ASSISTANT

## 2021-04-05 RX ORDER — HEPARIN SODIUM 5000 [USP'U]/.5ML
5000 INJECTION, SOLUTION INTRAVENOUS; SUBCUTANEOUS EVERY 8 HOURS
Status: DISCONTINUED | OUTPATIENT
Start: 2021-04-05 | End: 2021-04-05

## 2021-04-05 RX ORDER — HEPARIN SODIUM 10000 [USP'U]/100ML
0-5000 INJECTION, SOLUTION INTRAVENOUS CONTINUOUS
Status: DISCONTINUED | OUTPATIENT
Start: 2021-04-05 | End: 2021-04-07

## 2021-04-05 RX ORDER — POTASSIUM CHLORIDE 750 MG/1
20 TABLET, EXTENDED RELEASE ORAL ONCE
Status: COMPLETED | OUTPATIENT
Start: 2021-04-05 | End: 2021-04-05

## 2021-04-05 RX ORDER — WARFARIN SODIUM 7.5 MG/1
7.5 TABLET ORAL
Status: COMPLETED | OUTPATIENT
Start: 2021-04-05 | End: 2021-04-05

## 2021-04-05 RX ADMIN — TICAGRELOR 90 MG: 90 TABLET ORAL at 07:46

## 2021-04-05 RX ADMIN — ASPIRIN 81 MG: 81 TABLET, COATED ORAL at 07:46

## 2021-04-05 RX ADMIN — INSULIN ASPART 4 UNITS: 100 INJECTION, SOLUTION INTRAVENOUS; SUBCUTANEOUS at 07:46

## 2021-04-05 RX ADMIN — SODIUM CHLORIDE, POTASSIUM CHLORIDE, SODIUM LACTATE AND CALCIUM CHLORIDE 1000 ML: 600; 310; 30; 20 INJECTION, SOLUTION INTRAVENOUS at 04:06

## 2021-04-05 RX ADMIN — INSULIN ASPART 3 UNITS: 100 INJECTION, SOLUTION INTRAVENOUS; SUBCUTANEOUS at 17:51

## 2021-04-05 RX ADMIN — HYDROCHLOROTHIAZIDE 25 MG: 25 TABLET ORAL at 07:47

## 2021-04-05 RX ADMIN — HUMAN ALBUMIN MICROSPHERES AND PERFLUTREN 5 ML: 10; .22 INJECTION, SOLUTION INTRAVENOUS at 08:12

## 2021-04-05 RX ADMIN — INSULIN ASPART 3 UNITS: 100 INJECTION, SOLUTION INTRAVENOUS; SUBCUTANEOUS at 11:38

## 2021-04-05 RX ADMIN — HEPARIN SODIUM 1450 UNITS/HR: 10000 INJECTION, SOLUTION INTRAVENOUS at 15:08

## 2021-04-05 RX ADMIN — Medication 1 MG: at 00:04

## 2021-04-05 RX ADMIN — WARFARIN SODIUM 7.5 MG: 7.5 TABLET ORAL at 17:52

## 2021-04-05 RX ADMIN — ATORVASTATIN CALCIUM 40 MG: 40 TABLET, FILM COATED ORAL at 21:10

## 2021-04-05 RX ADMIN — SODIUM CHLORIDE, POTASSIUM CHLORIDE, SODIUM LACTATE AND CALCIUM CHLORIDE 500 ML: 600; 310; 30; 20 INJECTION, SOLUTION INTRAVENOUS at 02:35

## 2021-04-05 RX ADMIN — POTASSIUM CHLORIDE 20 MEQ: 750 TABLET, EXTENDED RELEASE ORAL at 15:01

## 2021-04-05 RX ADMIN — TICAGRELOR 90 MG: 90 TABLET ORAL at 21:10

## 2021-04-05 ASSESSMENT — ACTIVITIES OF DAILY LIVING (ADL)
WALKING_OR_CLIMBING_STAIRS_DIFFICULTY: NO
ADLS_ACUITY_SCORE: 17
ADLS_ACUITY_SCORE: 17
DIFFICULTY_COMMUNICATING: NO
ADLS_ACUITY_SCORE: 16
DOING_ERRANDS_INDEPENDENTLY_DIFFICULTY: NO
DIFFICULTY_EATING/SWALLOWING: NO
DRESSING/BATHING_DIFFICULTY: NO
ADLS_ACUITY_SCORE: 17
CONCENTRATING,_REMEMBERING_OR_MAKING_DECISIONS_DIFFICULTY: NO
TOILETING_ISSUES: NO

## 2021-04-05 NOTE — PHARMACY-ADMISSION MEDICATION HISTORY
Admission Medication History Completed by Pharmacy    See [facility] Admission Navigator for allergy information, preferred outpatient pharmacy, prior to admission medications and immunization status.     Medication History Sources:   Medication history interview sources:  EPIC chart review and Med Dr. Prajapati (Rx fill history website); NO patient/family interview      Changes made to PTA medication list (reason):    Added: None    Deleted: Pravastatin, no RX fill history in last year, refill note.    Changed: None    Additional Information:    None    Prior to Admission medications    Medication Sig Last Dose Taking? Auth Provider   hydrochlorothiazide (HYDRODIURIL) 25 MG tablet Take 25 mg by mouth daily  Yes Reported, Patient   insulin glargine (BASAGLAR KWIKPEN) 100 UNIT/ML pen Inject 18 Units Subcutaneous At Bedtime  Yes Reported, Patient   losartan (COZAAR) 100 MG tablet Take 100 mg by mouth daily  Yes Reported, Patient   metFORMIN (GLUCOPHAGE) 1000 MG tablet Take 1,000 mg by mouth 2 times daily (with meals)  Yes Reported, Patient   sitagliptin (JANUVIA) 100 MG tablet Take 100 mg by mouth daily  Yes Reported, Patient       Date completed: 04/05/21    Medication history completed by: Mili Sorensen [facility]

## 2021-04-05 NOTE — PLAN OF CARE
Major Shift Events:  ST wave elevation, otherwise vital signs WDL. Heparin gtt started. Up with SBA. Voiding spontaneously. Tolerating diet.     Plan: Continue w/ POC.    For vital signs and complete assessments, please see documentation flowsheets.

## 2021-04-05 NOTE — PLAN OF CARE
Major Shift Events: Afebrile. Tylenolx1 for R shoulder pain. Soft BPs overnight, 80s/50s-60s. MAP <65 around 0200 - MD notified. ECHO done and 500ml LR bolus given for hypotension. Denies nausea. R groin site soft, no hematoma. Bruisng on abdomen and tender - outlined and MD notified and came to assess pt. Hgb 10.8, MD notified. Abdominal CT ordered and completed - results pending. Voided 400ml via commode.      Plan: Continue to monitor BP and hgb.     For vital signs and complete assessments, please see documentation flowsheets.

## 2021-04-05 NOTE — PROGRESS NOTES
Major Shift Events: pt is alert and oriented x4, on RA, SR with PVC's, MAPS 64-70's, did get lightheaded this am with ambulation, echo completed, R groin site soft, bruising on RL abdomen, tender, no changes, has not voided since this am, low fat diet eating well.    Plan: encourage fluids, continue to monitor BP and possibly transfer to the floor this evening.  For vital signs and complete assessments, please see documentation flowsheets.     Transferred to: Other 4C at (time)  Belongings: sent with the patient.  Duff removed? N/A  Central line removed? Yes  Chart and medications sent with patient Yes  Family notified: Yes

## 2021-04-05 NOTE — PHARMACY-ANTICOAGULATION SERVICE
Clinical Pharmacy - Warfarin Dosing Consult     Pharmacy has been consulted to manage this patient s warfarin therapy.  Indication: Other - specify in comments(LV thormbus)  Therapy Goal: INR 2-3  Warfarin Prior to Admission: No  Significant drug interactions: heparin gtt  Recent documented change in oral intake/nutrition: Unknown    INR   Date Value Ref Range Status   04/05/2021 1.04 0.86 - 1.14 Final   04/04/2021 1.17 (H) 0.86 - 1.14 Final       Recommend warfarin 7.5 mg today.  Pharmacy will monitor Nighat Reyes daily and order warfarin doses to achieve specified goal.      Please contact pharmacy as soon as possible if the warfarin needs to be held for a procedure or if the warfarin goals change.

## 2021-04-05 NOTE — PROGRESS NOTES
[facility]   Critical Care Cardiology - Progress Note    Nighat Reyes MRN: [id number]  Age: 61 year old, : 1960  Date: 2021    Assessment and Plan:  Nighat Reyes is a 61 year old female with PMH significant for PDA (fixed surgically), T2DM, HTN, HLD who now presents with STEMI.    Today's Plan:  - discontinue hydralazine/metoprolol  - echocardiogram  - start coumadin; pharmacy consult to dose  - potasium protocol in place  - heparin ggt given LV thrombus    Neurology  No active issues    Cardiovascular  # Late presentation MI  # Ischemic Cardiomyopathy  # Residual RCA, RPL CAD  # Possible apical LV thrombus  Chest pain onset 4/3.  Episode of nausea  prompting ED visit.  Upon arrival, found to have ST segment elevation of leads V2-V6.  STEMI activation called for [facility]. Upon arrival to [facility], patient was chest pain free.  Coronary angiogram preformed which showed occlusion of her LAD. She has residual disease of her mRCA. ST segments did not normalize. Possible LV thrombus on echocardiogram . EF 35-40%. Troponin peak 112.   - heparin ggt for LV thrombus  - cardiac rehab referral placed  - goal directed medical therapy               - DAPT: 81mg ASA, 90mg Brilinta BID               - statin: 40mg atorvastatin daily; would plan to up titrate to 80mg if tolerated               - BB: hold given hypotension               - ACE/ARB: intolerant of lisinopril. Hold PTA losartan given recent contrast, 100mg daily. Start in the coming days  - will need mRCA intervention prior to discharge.  - start coumadin; pharmacy to dose     # HTN  Hypotension overnight. Resolved with 1L LR bolus  - stop HCTZ  - hold PTA losartan given contrast dye  - PRN hydralazine ordered with parameters    # Hx of PDA closure     Pulmonary  No active issues    Gastrointestinal, Nutrition  No active issues    Renal, Electrolytes  No active issues    Infectious Disease  No active  issues    Hematology  # Anemia  Likely secondary to hemodilution, blood loss related to procedure.  CT abdomen pelvis overnight which ruled out RP bleed  - continue to trend    Endocrinology  # T2DM, uncontrolled  A1c 10.9% on arrival.  Half of home lantus given on arrival given poor PO intake.    - increase lantus to 18u daily  - resume metformin/januvia in the coming days  - high sliding scale insulin with hyper/hypoglycemia protocol    MSK/Skin  No active issues    Pertinent Lines  N/A    ICU Cares:  Daily CXR: N/A  Fluids/Feeds: tolerating PO.  Fluids not indicated  DVT Prophylaxis: heparin ggt  GI Prophylaxis: not indicated  Bowel Regimen: PRN senna  Anticipated Floor Transfer: this afternoon versus tomorrow    Family Update: patient updated by me.  She will relay updates to her family.    Patient seen and discussed with Dr. Jak Murphy.  Assessment and plan as above.    Ignacia Zarate PA-C  Critical Care Cardiology  Pager: [phone]      Interval History:  No acute events overnight.  Mild asymptomatic hypotension. Denies nausea, vomiting, chest pain, SOB, new LE pain/swelling.      Objective Findings:  Temp:  [97.4  F (36.3  C)-98.9  F (37.2  C)] 98.7  F (37.1  C)  Pulse:  [] 73  Resp:  [10-27] 16  BP: ()/(49-99) 80/58  SpO2:  [94 %-100 %] 98 %    I/O:    Intake/Output Summary (Last 24 hours) at 4/5/2021 1246  Last data filed at 4/5/2021 1200  Gross per 24 hour   Intake 1710 ml   Output 1100 ml   Net 610 ml     Physical Exam:  GEN: alert and oriented, appears comfortable  HEENT: no appreciable cranial trauma. Scleral white  Pulm: CTAB. No rales/rhonchi  Cardiac: regular rate/rhythm. No murmur, rub, gallop  GI: soft, non distended  Neuro: equal  strength  Integument: no skin breakdown  Vascular: LE warm, well perfused.  R groin angiogram access site mildly tender.  No appreciable hematoma      Medications:    aspirin  81 mg Oral Daily     atorvastatin  40 mg Oral QPM     hydrochlorothiazide   25 mg Oral Daily     insulin aspart  1-10 Units Subcutaneous TID AC     insulin aspart  1-7 Units Subcutaneous At Bedtime     insulin glargine  9 Units Subcutaneous At Bedtime     metoprolol tartrate  12.5 mg Oral BID     sodium chloride (PF)  3 mL Intracatheter Q8H     ticagrelor  90 mg Oral Q12H       - MEDICATION INSTRUCTIONS -       Percutaneous Coronary Intervention orders placed (this is information for BPA alerting)       ACE/ARB/ARNI NOT PRESCRIBED           Labs:   CMP  Recent Labs   Lab 04/05/21  0405 04/04/21  1433 04/04/21  1317   *  --  130*   POTASSIUM 3.5  --  4.8   CHLORIDE 98  --  97   CO2 24  --  22   ANIONGAP 8  --  11   * 286* 306*   BUN 21  --  16   CR 0.78  --  0.70   GFRESTIMATED 81  --  >90   GFRESTBLACK >90  --  >90   LEYLA 8.4*  --  9.2     CBC  Recent Labs   Lab 04/05/21  0453 04/05/21  0405 04/04/21  1317   WBC  --  9.5 11.6*   RBC  --  3.60* 4.66   HGB 10.6* 10.8* 14.7   HCT  --  31.8* 41.5   MCV  --  88 89   MCH  --  30.0 31.5   MCHC  --  34.0 35.4   RDW  --  12.7 12.4   PLT  --  212 281     Pertinent Imaging Studies:  Echocardiogram: The Ejection Fraction is estimated at 35-40% with mid to apical setpum, the  apical anterior wall and apex are akinetic. The basal septum and mid and  apical lateral wall are is severely hypokinetic.     Can not rule out tiny (0.65cmX0.45cm) apical lv thrombus. Consider additional  imaging or repeat imaging with contrast if able.     Diastolic Doppler findings (E/E' ratio and/or other parameters) suggest left  ventricular filling pressures are increased.     Right ventricular function, chamber size, wall motion, and thickness are  normal.     No pericardial effusion is present.     This study was compared with the study from 4/5/2021 .  This is a more complete study including contrasted images. LV ef, while  reported as mildly improved is likely not significantly changed. The findings  above, including the inability to rule out a tiny apical  thrombus were  communicated the primary team by phone 4/5/2021 at 915a.    Coronary Angiogram:  Mid LAD lesion is 100% stenosed.    Mid RCA lesion is 70% stenosed.    1st RPL lesion is 80% stenosed.  Evolved anterior STEMI with no reflow post PCI and PEGGY 1 flow after treatment with vasodilators.  Single 2.75x28 mm HASEEB placement at mid LAD.   RCA has disease that should be addressed if symptoms or ischemia is present      Ignacia Zarate PA-C  Critical Care Cardiology  Pager: [phone]

## 2021-04-06 ENCOUNTER — APPOINTMENT (OUTPATIENT)
Dept: OCCUPATIONAL THERAPY | Facility: CLINIC | Age: 61
DRG: 247 | End: 2021-04-06
Attending: PHYSICIAN ASSISTANT
Payer: COMMERCIAL

## 2021-04-06 LAB
ANION GAP SERPL CALCULATED.3IONS-SCNC: 7 MMOL/L (ref 3–14)
BUN SERPL-MCNC: 17 MG/DL (ref 7–30)
CALCIUM SERPL-MCNC: 8.6 MG/DL (ref 8.5–10.1)
CHLORIDE SERPL-SCNC: 98 MMOL/L (ref 94–109)
CO2 SERPL-SCNC: 24 MMOL/L (ref 20–32)
CREAT SERPL-MCNC: 0.7 MG/DL (ref 0.52–1.04)
ERYTHROCYTE [DISTWIDTH] IN BLOOD BY AUTOMATED COUNT: 12.7 % (ref 10–15)
GFR SERPL CREATININE-BSD FRML MDRD: >90 ML/MIN/{1.73_M2}
GLUCOSE BLDC GLUCOMTR-MCNC: 191 MG/DL (ref 70–99)
GLUCOSE BLDC GLUCOMTR-MCNC: 225 MG/DL (ref 70–99)
GLUCOSE BLDC GLUCOMTR-MCNC: 226 MG/DL (ref 70–99)
GLUCOSE BLDC GLUCOMTR-MCNC: 299 MG/DL (ref 70–99)
GLUCOSE SERPL-MCNC: 190 MG/DL (ref 70–99)
HCT VFR BLD AUTO: 29.1 % (ref 35–47)
HGB BLD-MCNC: 10.3 G/DL (ref 11.7–15.7)
INR PPP: 1.49 (ref 0.86–1.14)
INTERPRETATION ECG - MUSE: NORMAL
MCH RBC QN AUTO: 31.8 PG (ref 26.5–33)
MCHC RBC AUTO-ENTMCNC: 35.4 G/DL (ref 31.5–36.5)
MCV RBC AUTO: 90 FL (ref 78–100)
PLATELET # BLD AUTO: 182 10E9/L (ref 150–450)
POTASSIUM SERPL-SCNC: 3.3 MMOL/L (ref 3.4–5.3)
POTASSIUM SERPL-SCNC: 4.3 MMOL/L (ref 3.4–5.3)
RBC # BLD AUTO: 3.24 10E12/L (ref 3.8–5.2)
SODIUM SERPL-SCNC: 129 MMOL/L (ref 133–144)
UFH PPP CHRO-ACNC: 0.26 IU/ML
UFH PPP CHRO-ACNC: 0.56 IU/ML
UFH PPP CHRO-ACNC: >1.1 IU/ML
WBC # BLD AUTO: 8.8 10E9/L (ref 4–11)

## 2021-04-06 PROCEDURE — 250N000013 HC RX MED GY IP 250 OP 250 PS 637: Performed by: PHYSICIAN ASSISTANT

## 2021-04-06 PROCEDURE — 36415 COLL VENOUS BLD VENIPUNCTURE: CPT | Performed by: PHYSICIAN ASSISTANT

## 2021-04-06 PROCEDURE — 97530 THERAPEUTIC ACTIVITIES: CPT | Mod: GO | Performed by: OCCUPATIONAL THERAPIST

## 2021-04-06 PROCEDURE — 85520 HEPARIN ASSAY: CPT | Performed by: NURSE PRACTITIONER

## 2021-04-06 PROCEDURE — 97165 OT EVAL LOW COMPLEX 30 MIN: CPT | Mod: GO | Performed by: OCCUPATIONAL THERAPIST

## 2021-04-06 PROCEDURE — 85027 COMPLETE CBC AUTOMATED: CPT | Performed by: PHYSICIAN ASSISTANT

## 2021-04-06 PROCEDURE — 99232 SBSQ HOSP IP/OBS MODERATE 35: CPT | Performed by: NURSE PRACTITIONER

## 2021-04-06 PROCEDURE — 85610 PROTHROMBIN TIME: CPT | Performed by: PHYSICIAN ASSISTANT

## 2021-04-06 PROCEDURE — 120N000002 HC R&B MED SURG/OB UMMC

## 2021-04-06 PROCEDURE — 250N000013 HC RX MED GY IP 250 OP 250 PS 637: Performed by: NURSE PRACTITIONER

## 2021-04-06 PROCEDURE — 85520 HEPARIN ASSAY: CPT

## 2021-04-06 PROCEDURE — 250N000011 HC RX IP 250 OP 636: Performed by: PHYSICIAN ASSISTANT

## 2021-04-06 PROCEDURE — 84132 ASSAY OF SERUM POTASSIUM: CPT

## 2021-04-06 PROCEDURE — 80048 BASIC METABOLIC PNL TOTAL CA: CPT | Performed by: PHYSICIAN ASSISTANT

## 2021-04-06 PROCEDURE — 250N000013 HC RX MED GY IP 250 OP 250 PS 637

## 2021-04-06 PROCEDURE — 999N001017 HC STATISTIC GLUCOSE BY METER IP

## 2021-04-06 PROCEDURE — 36415 COLL VENOUS BLD VENIPUNCTURE: CPT

## 2021-04-06 PROCEDURE — 93010 ELECTROCARDIOGRAM REPORT: CPT | Performed by: INTERNAL MEDICINE

## 2021-04-06 PROCEDURE — 93005 ELECTROCARDIOGRAM TRACING: CPT

## 2021-04-06 PROCEDURE — 36415 COLL VENOUS BLD VENIPUNCTURE: CPT | Performed by: NURSE PRACTITIONER

## 2021-04-06 PROCEDURE — 85520 HEPARIN ASSAY: CPT | Performed by: PHYSICIAN ASSISTANT

## 2021-04-06 RX ORDER — METOPROLOL TARTRATE 25 MG/1
25 TABLET, FILM COATED ORAL 2 TIMES DAILY
Status: DISCONTINUED | OUTPATIENT
Start: 2021-04-06 | End: 2021-04-07 | Stop reason: HOSPADM

## 2021-04-06 RX ORDER — POTASSIUM CHLORIDE 750 MG/1
40 TABLET, EXTENDED RELEASE ORAL ONCE
Status: COMPLETED | OUTPATIENT
Start: 2021-04-06 | End: 2021-04-06

## 2021-04-06 RX ORDER — WARFARIN SODIUM 4 MG/1
4 TABLET ORAL
Status: COMPLETED | OUTPATIENT
Start: 2021-04-06 | End: 2021-04-06

## 2021-04-06 RX ADMIN — METOPROLOL TARTRATE 25 MG: 25 TABLET, FILM COATED ORAL at 20:47

## 2021-04-06 RX ADMIN — INSULIN ASPART 4 UNITS: 100 INJECTION, SOLUTION INTRAVENOUS; SUBCUTANEOUS at 17:56

## 2021-04-06 RX ADMIN — ACETAMINOPHEN 650 MG: 325 TABLET, FILM COATED ORAL at 01:37

## 2021-04-06 RX ADMIN — HEPARIN SODIUM 1250 UNITS/HR: 10000 INJECTION, SOLUTION INTRAVENOUS at 05:19

## 2021-04-06 RX ADMIN — INSULIN ASPART 7 UNITS: 100 INJECTION, SOLUTION INTRAVENOUS; SUBCUTANEOUS at 12:26

## 2021-04-06 RX ADMIN — ATORVASTATIN CALCIUM 40 MG: 40 TABLET, FILM COATED ORAL at 20:47

## 2021-04-06 RX ADMIN — METOPROLOL TARTRATE 25 MG: 25 TABLET, FILM COATED ORAL at 11:11

## 2021-04-06 RX ADMIN — POTASSIUM CHLORIDE 40 MEQ: 750 TABLET, EXTENDED RELEASE ORAL at 11:11

## 2021-04-06 RX ADMIN — ASPIRIN 81 MG: 81 TABLET, COATED ORAL at 08:40

## 2021-04-06 RX ADMIN — INSULIN ASPART 3 UNITS: 100 INJECTION, SOLUTION INTRAVENOUS; SUBCUTANEOUS at 08:37

## 2021-04-06 RX ADMIN — WARFARIN SODIUM 4 MG: 4 TABLET ORAL at 17:57

## 2021-04-06 RX ADMIN — TICAGRELOR 90 MG: 90 TABLET ORAL at 20:47

## 2021-04-06 RX ADMIN — TICAGRELOR 90 MG: 90 TABLET ORAL at 08:40

## 2021-04-06 ASSESSMENT — ACTIVITIES OF DAILY LIVING (ADL)
ADLS_ACUITY_SCORE: 17

## 2021-04-06 NOTE — PROGRESS NOTES
Patient arrived on unit around 1100 today from 4C with VSS on RA. Hep drip @950 units/hr. RN to RN report completed prior to transfer. Pt oriented to unit.

## 2021-04-06 NOTE — PLAN OF CARE
Major Shift Events:  Neuros unchanged. Tolerating RA and NSR (with ST elevation). MAPs >65. Heparin gtt infusing at 1100 un/hr.     Plan: Continue to educate on heart disease, encourage activity, and prepare for cardiac MRI.    For vital signs and complete assessments, please see documentation flowsheets.     Problem: Adjustment to Illness (Delirium)  Goal: Optimal Coping  Outcome: No Change     Problem: Altered Behavior (Delirium)  Goal: Improved Behavioral Control  Outcome: No Change

## 2021-04-06 NOTE — PROGRESS NOTES
04/06/21 1155   Quick Adds   Type of Visit Initial Occupational Therapy Evaluation   Living Environment   People in home spouse   Current Living Arrangements house   Transportation Anticipated car, drives self;family or friend will provide   Living Environment Comments 3 EDUARD, all needs met on main floor, walk-in shower   Self-Care   Usual Activity Tolerance good   Current Activity Tolerance moderate   Regular Exercise No   Equipment Currently Used at Home none   Activity/Exercise/Self-Care Comment Pt was I in all ADL/IADLs prior to admission including cooking, cleaning, driving, and medication management. Works a desk job full time. No HEP.   Disability/Function   Fall history within last six months yes   Number of times patient has fallen within last six months 1  (inured L humerus)   Change in Functional Status Since Onset of Current Illness/Injury yes   General Information   Referring Physician Ignacia Zarate PA-C   Patient/Family Therapy Goal Statement (OT) Return to PLOF   Additional Occupational Profile Info/Pertinent History of Current Problem  61 year old female with PMH significant for PDA (fixed surgically), T2DM, HTN, HLD who now presents with STEMI.   Existing Precautions/Restrictions no known precautions/restrictions   Cognitive Status Examination   Cognitive Status Comments No concerns   Visual Perception   Visual Acuity No concerns   Range of Motion Comprehensive   Comment, General Range of Motion WFL RUE. Shoulder flex/abd AROM to ~90 with LUE due to LUE injury in Nov 2020.   Clinical Impression   Criteria for Skilled Therapeutic Interventions Met (OT) yes   OT Diagnosis Decreased I in ADLs due to deconditioning   Assessment of Occupational Performance 1-3 Performance Deficits   Identified Performance Deficits functional endurance   Clinical Decision Making Complexity (OT) low complexity   Therapy Frequency (OT) 5x/week   Predicted Duration of Therapy 2 weeks   Risk & Benefits of therapy  have been explained patient   Total Evaluation Time (Minutes)   Total Evaluation Time (Minutes) 10

## 2021-04-06 NOTE — CONSULTS
Diabetes/Hyperglycemia Management Consult    Chief Complaint glycemic management recmmendations  Consult requested by: Bernice Null CNP  History of Present Illness Nighat Reyes is a 61 year old female with history of type 2 diabetes, hypertension, hyperlipidemia, surgical repair of patent ductus arteriosis ( PDA), presented to [facility] with anterior STEMI following 24 hours of chest discomfort.now s/p HASEEB x 1  Found to have multi-vessel CAD, HFrEF ( EF 35-40%) 2/2 ischemic cardiomyopathy, possible apical LV thrombus.      Information was obtained from review of medical records and interview with patient.    Nighat reports she was dx with type 2 diabetes many years ago. PTA medications as listed below. Endorses trying to test twice daily, but this does not always happen. Blood sugars at home in the 200's.  Nighat was pleased to see her fasting glcusoe at 155 this am. IS NPO for a study.    On presentation, [id number] with A1C of 10.9%. was given half of home basal insulin due to NPO that evening, lantus 9 units along with novolog sliding scale.Blood sugars in the high 100's to 305.  lantus increased to 18 units on (4/5) an glcusoe 190/191 along with novolog high intensity sliding scale.  Glucose  to 299, presumed after eating and no prandial insulin.    Basal insulin was increased to 20 units at bedtime and added prandial insulin along with novolgo sliding scale.           Currently, denies fever, chills, chest pain, shortness of breath, abdominal pain, nausea and or vomiting, bowel or bladder issues. NPO for a study      Recent Labs   Lab 04/06/21  1210 04/06/21  0834 04/06/21  0552 04/05/21  2121 04/05/21  1750 04/05/21  1137 04/05/21  0745 04/05/21  0405 04/04/21  1433 04/04/21  1433 04/04/21  1317   GLC  --   --  190*  --   --   --   --  208*  --  286* 306*   * 191*  --  250* 194* 198* 219*  --    < >  --   --     < > = values in this interval not displayed.         Diabetes Type: Type 2 diabetes   Diabetes  Duration: many years  Usual Diabetes Regimen:   Basaglar 18 units at bedtime  Sitagliptin ( januvia) 100 mg daily  Metformin 1000 mg twice dialy   Victoza--> vomiting  Ability to [place] Prescribed Regimen: yes  Diabetes Control:   Lab Results   Component Value Date    A1C 10.9 04/04/2021     Diabetes Complications: none per patient  History of DKA: no  Able to Detect Hypoglycemia: has not experienced  Usual Diabetes Care Provider: PCP  Factors Impacting Glucose Control: lack of testing, diet      Review of Systems  10 point ROS completed with pertinent positives and negatives noted in the HPI    Past medical, family and social histories are reviewed and updated.    Past Medical History  type 2 diabetes,   hypertension,   hyperlipidemia,   surgical repair of patent ductus arteriosis ( PDA),    Family History  No family history specific for diabetes  Social History  Social History     Socioeconomic History     Marital status:      Spouse name: Not on file     Number of children: Not on file     Years of education: Not on file     Highest education level: Not on file   Occupational History     Not on file   Social Needs     Financial resource strain: Not on file     Food insecurity     Worry: Not on file     Inability: Not on file     Transportation needs     Medical: Not on file     Non-medical: Not on file   Tobacco Use     Smoking status: Not on file   Substance and Sexual Activity     Alcohol use: Not on file     Drug use: Not on file     Sexual activity: Not on file   Lifestyle     Physical activity     Days per week: Not on file     Minutes per session: Not on file     Stress: Not on file   Relationships     Social connections     Talks on phone: Not on file     Gets together: Not on file     Attends [employer] service: Not on file     Active member of club or organization: Not on file     Attends meetings of clubs or organizations: Not on file     Relationship status: Not on file     Intimate partner  violence     Fear of current or ex partner: Not on file     Emotionally abused: Not on file     Physically abused: Not on file     Forced sexual activity: Not on file   Other Topics Concern     Not on file   Social History Narrative     Not on file         Physical Exam  BP (!) 89/60   Pulse 74   Temp 98.5  F (36.9  C) (Oral)   Resp 23   Wt 82.9 kg (182 lb 12.2 oz)   SpO2 98%   BMI 29.50 kg/m      General:  pleasant  resting in bed, in no distress.   HEENT: PER and anicteric, non-injected, oral mucous membranes moist.   Lungs:  Non-labored  ABD: soft  Skin: warm and dry   MSK:  Moving all extremities  Mental status:  alert, oriented x3, communicating clearly  Psych:  calm, even mood    Laboratory  Recent Labs   Lab Test 04/06/21  0552 04/05/21  1321 04/05/21  0405   *  --  130*   POTASSIUM 3.3* 3.9 3.5   CHLORIDE 98  --  98   CO2 24  --  24   ANIONGAP 7  --  8   *  --  208*   BUN 17  --  21   CR 0.70  --  0.78   LEYLA 8.6  --  8.4*     CBC RESULTS:   Recent Labs   Lab Test 04/06/21  0552   WBC 8.8   RBC 3.24*   HGB 10.3*   HCT 29.1*   MCV 90   MCH 31.8   MCHC 35.4   RDW 12.7          Liver Function Studies - No results for input(s): PROTTOTAL, ALBUMIN, BILITOTAL, ALKPHOS, AST, ALT, BILIDIRECT in the last 10496 hours.    Active Medications  Current Facility-Administered Medications   Medication     acetaminophen (TYLENOL) tablet 650 mg     aspirin EC tablet 81 mg     atorvastatin (LIPITOR) tablet 40 mg     Continuing statin from home medication list OR statin order already placed during this visit     glucose gel 15-30 g    Or     dextrose 50 % injection 25-50 mL    Or     glucagon injection 1 mg     heparin 25,000 units in 0.45% NaCl 250 mL ANTICOAGULANT infusion     HOLD: Metformin and metformin containing medications on day of procedure and 48 hours after IV contrast given for patients with acute kidney injury or severe chronic kidney disease (stage IV or stage V; i.e., eGFR less than 30)      insulin aspart (NovoLOG) injection (RAPID ACTING)     insulin aspart (NovoLOG) injection (RAPID ACTING)     insulin glargine (LANTUS PEN) injection 18 Units     lidocaine (LMX4) cream     lidocaine 1 % 0.1-1 mL     melatonin tablet 1 mg     metoprolol tartrate (LOPRESSOR) tablet 25 mg     midazolam (VERSED) injection 0.5-1 mg     naloxone (NARCAN) injection 0.2 mg    Or     naloxone (NARCAN) injection 0.4 mg    Or     naloxone (NARCAN) injection 0.2 mg    Or     naloxone (NARCAN) injection 0.4 mg     nitroGLYcerin (NITROSTAT) sublingual tablet 0.4 mg     ondansetron (ZOFRAN-ODT) ODT tab 4 mg    Or     ondansetron (ZOFRAN) injection 4 mg     Patient is already receiving anticoagulation with heparin, enoxaparin (LOVENOX), warfarin (COUMADIN)  or other anticoagulant medication     Percutaneous Coronary Intervention orders placed (this is information for BPA alerting)     Reason ACE/ARB/ARNI order not selected     sodium chloride (PF) 0.9% PF flush 3 mL     sodium chloride (PF) 0.9% PF flush 3 mL     sodium chloride (PF) 0.9% PF flush 3 mL     ticagrelor (BRILINTA) tablet 90 mg     warfarin ANTICOAGULANT (COUMADIN) tablet 4 mg     Warfarin Therapy Reminder (Check START DATE - warfarin may be starting in the FUTURE)     No current outpatient medications on file.       Current Diet  Orders Placed This Encounter      Low Saturated Fat Na <2400 mg        Assessment: Nighat Reyes is a 61 year old female with history of type 2 diabetes, hypertension, hyperlipidemia, surgical repair of patent ductus arteriosis ( PDA), presented to [facility] with anterior STEMI following 24 hours of chest discomfort.now s/p HASEEB x 1  Found to have multi-vessel CAD, HFrEF ( EF 35-40%) 2/2 ischemic cardiomyopathy, possible apical LV thrombus.    Type 2 diabetic: poorly controlled with A1C of 10.9% (4/4/2021)  - may need to switch to a GLP-1 versus DPP-4 for cardio-protective and better A1C reduction. Per chart review, did not tolerate Victoza.       Plan  - Lantus 20 units at bedtime, will increase to 22 units  Novolog 1 unit per 12 grams of CHO for meals/snacks/supplements  Added januvia 100 mg daily  -hold Metformin for now  novolog  High intensity sliding scale before meals and at bedtime  -hypoglycemia protocol  - will continue to follow        To contact Endocrine Diabetes service:   From 8AM-4PM: page inpatient diabetes provider that is following the patient  For questions or updates from 4PM-8AM: page the diabetes job code for on call fellow: [phone]    I spent a total of 80 minutes bedside and on the inpatient unit managing the glycemic care of Nighat Ryees. Over 50% of my time on the unit was spent counseling the patient  and/or coordinating care regarding .  See note for details.    Angel Arce -[phone]  Diabetes Management job code [phone]

## 2021-04-06 NOTE — PROGRESS NOTES
Interventional Cardiology Progress Note      Assessment & Plan:  Nighat Reyes is a pleasant 61 year old with past medical history of T2DM, HTN, HLD, and patent ductus arteriosis (surgically repaired) who presented to [facility] with anterior STEMI following > 24 hours of chest discomfort.     Today's Plan:  - Endocrine consult for hyperglycemia management  - Nutrition consult   - Cardiac rehab  - Cardiac MRI tomorrow to further evaluate LV thrombus    # Multi-vessel CAD c/b anterior STEMI  # HFrEF 2/2 ischemic cardiomyopathy   # Possible apical LV thrombus   # Hx of surgically repaired patent ductus arteriosis  Patient presented to ED with >24 hours of intermittent chest discomfort associated with nausea and vomiting. EKG consistent with anterior STEMI with ST elevation in leads V2-V6. Patient was brought emergently to cath lab and underwent coronary angiogram that showed 100% occluded LAD that was treated with HASEEB x 1 with no reflow post-PCI and PEGGY 1 flow after treatment with vasodilators. There is also significant disease of mRCA and RPL that would be amenable to PCI. Troponin peak 112. Echo shows reduced LV function with EF 35-40%, the apical anterior wall and apex are akinetic, and the basal septum and mid-apical lateral wall is severely hypokinetic. There is also a small apical LV thrombus that cannot be ruled out. No arrhythmias post-angiogram. Persistent EDUARD on subsequent EKGs likely 2/2 no reflow post-PCI.   - DAPT: aspirin 81 mg daily and Brilinta 90 mg BID  - BB: metoprolol tartrate 25 mg BID  - ACE: holding PTA losartan due to hypotension, likely resume prior to discharge   - Statin: atorvastatin 40 mg daily, increase as tolerated    - Volume status: euvolemic, will hold off on diuretics   - Continue heparin gtt for possible LV thrombus   - Start coumadin, pharmacy to dose   - Cardiac rehab   - Cardiac MRI tomorrow, 4/7, to confirm LV thrombus   - Recommend staged intervention to RCA and RPL as outpatient in  2-4 weeks   - Repeat echo in 3 months   - Will follow-up with cardiology at [place] location     #HTN  Well-controlled. PTA losartan and hydrochlorothiazide. Intermittent episodes of hypotension with SBP 80's.   - Hold PTA losartan, as above  - Stop hydrochlorothiazide   - Continue metoprolol tartrate 25 mg BID    # HLD  . No PTA statin.   - Continue atorvastatin 40 mg daily, increase as tolerated   - Baseline LFTs, recheck in 6-8 weeks   - Repeat lipid panel in 3 months    # T2DM, uncontrolled   A1C 10.9. -300's this admission. PTA sitagliptin and metformin.   - Endocrine consult   - Nutrition consult   - High intensity sliding scale   - Q4 and HS BG checks  - Hypoglycemia protocol     # Acute blood loss anemia   Likely 2/2 to hemodilution and blood loss during procedure. CT abdomen and pelvis negative for RP bleed. Hgb 10.3, stable.   - Daily CBC      Diet: Cardiac   Activity: as tolerated   Code Status: Full   Disposition: likely 1-2 days pending clinical status    Patient seen and discussed w/ Dr. Costello. He agrees with the above plan.       Bernice Null DNP, APRN, CNP  [facility]  Interventional Cardiology  [phone]      Interval History: No acute events overnight. SBP in the 80's overnight, asymptomatic. On heparin gtt for possible LV thrombus. NSR on telemetry. Denies CP, SOB, dizziness, lightheadedness, PND, or orthopnea. RFA site CDI without bleeding or bruising.     Most recent vital signs:  BP 97/68   Pulse 92   Temp 97.5  F (36.4  C) (Oral)   Resp 19   Wt 81.3 kg (179 lb 3.7 oz)   SpO2 98%   BMI 28.93 kg/m    Temp:  [98.1  F (36.7  C)-98.9  F (37.2  C)] 98.4  F (36.9  C)  Pulse:  [76-99] 84  Resp:  [16-32] 16  BP: ()/(54-88) 102/69  SpO2:  [93 %-99 %] 98 %  Wt Readings from Last 2 Encounters:   04/06/21 81.3 kg (179 lb 3.7 oz)   04/04/21 81.6 kg (180 lb)       Intake/Output Summary (Last 24 hours) at 4/6/2021 1046  Last data filed at 4/6/2021  0900  Gross per 24 hour   Intake 550.07 ml   Output 1800 ml   Net -1249.93 ml       Physical exam:  General: In bed, in NAD  HEENT: EOMI, PERRLA, no scleral icterus or injection  CARDIAC: RRR, no m/r/g appreciated. Peripheral pulses 2+  RESP: CTAB, no wheezes, rhonchi or crackles appreciated.  GI: NABS, NT/ND, no guarding or rebound  EXTREMITIES: NO LE edema, pulses 2+. Femoral access site w/o bleeding, dressing c/d/i. No bruits appreciated.   SKIN: No acute lesions appreciated  NEURO: Alert and oriented X3, CN II-XII grossly intact, no focal neurological deficits noted    Drains and Tubes: No drains or tubes present  Access: No central lines or devices in place    Labs (Past three days):  CBC  Recent Labs   Lab 04/06/21  0552 04/05/21  1406 04/05/21  1126 04/05/21  0453 04/05/21  0405 04/04/21  1317   WBC 8.8 10.1  --   --  9.5 11.6*   RBC 3.24* 3.79*  --   --  3.60* 4.66   HGB 10.3* 11.9 10.9* 10.6* 10.8* 14.7   HCT 29.1* 34.4*  --   --  31.8* 41.5   MCV 90 91  --   --  88 89   MCH 31.8 31.4  --   --  30.0 31.5   MCHC 35.4 34.6  --   --  34.0 35.4   RDW 12.7 12.9  --   --  12.7 12.4    211  --   --  212 281     BMP  Recent Labs   Lab 04/06/21  0552 04/05/21  1321 04/05/21  0405 04/04/21  1433 04/04/21  1317   *  --  130*  --  130*   POTASSIUM 3.3* 3.9 3.5  --  4.8   CHLORIDE 98  --  98  --  97   CO2 24  --  24  --  22   ANIONGAP 7  --  8  --  11   *  --  208* 286* 306*   BUN 17  --  21  --  16   CR 0.70  --  0.78  --  0.70   GFRESTIMATED >90  --  81  --  >90   GFRESTBLACK >90  --  >90  --  >90   LEYLA 8.6  --  8.4*  --  9.2     Troponins:   Lab Results   Component Value Date    TROPI 41.148 () 04/05/2021    TROPI 70.954 () 04/05/2021    TROPI 112.728 () 04/04/2021    TROPI 102.459 () 04/04/2021    TROPI 19.767 () 04/04/2021        INR  Recent Labs   Lab 04/06/21  0552 04/05/21  1321 04/04/21  1335   INR 1.49* 1.04 1.17*     Liver panelNo lab results found in last 7  days.    Imaging/procedure results:  EKG 12 Lead: 4/5/21      ECHO: 4/6/21  Interpretation Summary  The Ejection Fraction is estimated at 35-40% with mid to apical setpum, the  apical anterior wall and apex are akinetic. The basal septum and mid and  apical lateral wall are is severely hypokinetic.     Can not rule out tiny (0.65cmX0.45cm) apical lv thrombus. Consider additional  imaging or repeat imaging with contrast if able.     Diastolic Doppler findings (E/E' ratio and/or other parameters) suggest left  ventricular filling pressures are increased.     Right ventricular function, chamber size, wall motion, and thickness are  normal.     No pericardial effusion is present.    Coronary Angiogram: 4/4/21  Conclusion         Mid LAD lesion is 100% stenosed.    Mid RCA lesion is 70% stenosed.    1st RPL lesion is 80% stenosed.     Evolved anterior STEMI with no reflow post PCI and PEGGY 1 flow after treatment with vasodilators.  Single 2.75x28 mm HASEEB placement at mid LAD.   RCA has disease that should be addressed if symptoms or ischemia is present

## 2021-04-06 NOTE — PROGRESS NOTES
"CLINICAL NUTRITION SERVICES - BRIEF NOTE    Received provider consult for nutrition education with comments uncontrolled diabetes in the setting of diffuse CAD. Briefly met with patient (as already received heart-healthy diet education earlier today). Pt reports understanding of consistent carbohydrate intake in relation to blood sugar control. Admittedly has been less mindful of carbohydrate intake lately, but states she will \"do better.\" Politely declines need for written or verbal diabetes diet education at this time.      RD will follow per LOS protocol or if re-consulted.     Sania Jaffe RD, LD  [id number]  Pgr: [phone]     "

## 2021-04-06 NOTE — CONSULTS
Inpatient Diabetes Service  Received consult, full consult in am (4/7)  Increased basal insulin form 18 units to 20 units  Added prandial insulin 1 unit per 12 grams of CHO  Continued novolog high correction scale  Angel Arce CNP  Inpatient Diabetes Service  Pager [phone]  Job code pager [phone]

## 2021-04-07 ENCOUNTER — APPOINTMENT (OUTPATIENT)
Dept: OCCUPATIONAL THERAPY | Facility: CLINIC | Age: 61
DRG: 247 | End: 2021-04-07
Payer: COMMERCIAL

## 2021-04-07 ENCOUNTER — PATIENT OUTREACH (OUTPATIENT)
Dept: CARE COORDINATION | Facility: CLINIC | Age: 61
End: 2021-04-07

## 2021-04-07 ENCOUNTER — APPOINTMENT (OUTPATIENT)
Dept: MRI IMAGING | Facility: CLINIC | Age: 61
DRG: 247 | End: 2021-04-07
Attending: NURSE PRACTITIONER
Payer: COMMERCIAL

## 2021-04-07 VITALS
DIASTOLIC BLOOD PRESSURE: 73 MMHG | SYSTOLIC BLOOD PRESSURE: 109 MMHG | RESPIRATION RATE: 26 BRPM | OXYGEN SATURATION: 98 % | BODY MASS INDEX: 29.5 KG/M2 | HEART RATE: 92 BPM | TEMPERATURE: 98.5 F | WEIGHT: 182.76 LBS

## 2021-04-07 LAB
ALBUMIN SERPL-MCNC: 2.7 G/DL (ref 3.4–5)
ALP SERPL-CCNC: 53 U/L (ref 40–150)
ALT SERPL W P-5'-P-CCNC: 29 U/L (ref 0–50)
ANION GAP SERPL CALCULATED.3IONS-SCNC: 8 MMOL/L (ref 3–14)
AST SERPL W P-5'-P-CCNC: 44 U/L (ref 0–45)
BILIRUB DIRECT SERPL-MCNC: 0.2 MG/DL (ref 0–0.2)
BILIRUB SERPL-MCNC: 0.6 MG/DL (ref 0.2–1.3)
BUN SERPL-MCNC: 13 MG/DL (ref 7–30)
CALCIUM SERPL-MCNC: 9 MG/DL (ref 8.5–10.1)
CHLORIDE SERPL-SCNC: 98 MMOL/L (ref 94–109)
CO2 SERPL-SCNC: 23 MMOL/L (ref 20–32)
CREAT SERPL-MCNC: 0.74 MG/DL (ref 0.52–1.04)
ERYTHROCYTE [DISTWIDTH] IN BLOOD BY AUTOMATED COUNT: 12.8 % (ref 10–15)
GFR SERPL CREATININE-BSD FRML MDRD: 86 ML/MIN/{1.73_M2}
GLUCOSE BLDC GLUCOMTR-MCNC: 164 MG/DL (ref 70–99)
GLUCOSE BLDC GLUCOMTR-MCNC: 178 MG/DL (ref 70–99)
GLUCOSE SERPL-MCNC: 155 MG/DL (ref 70–99)
HCT VFR BLD AUTO: 29.9 % (ref 35–47)
HGB BLD-MCNC: 10 G/DL (ref 11.7–15.7)
INR PPP: 1.19 (ref 0.86–1.14)
MCH RBC QN AUTO: 30 PG (ref 26.5–33)
MCHC RBC AUTO-ENTMCNC: 33.4 G/DL (ref 31.5–36.5)
MCV RBC AUTO: 90 FL (ref 78–100)
PLATELET # BLD AUTO: 196 10E9/L (ref 150–450)
POTASSIUM SERPL-SCNC: 3.7 MMOL/L (ref 3.4–5.3)
POTASSIUM SERPL-SCNC: 4 MMOL/L (ref 3.4–5.3)
PROT SERPL-MCNC: 6 G/DL (ref 6.8–8.8)
RBC # BLD AUTO: 3.33 10E12/L (ref 3.8–5.2)
SODIUM SERPL-SCNC: 130 MMOL/L (ref 133–144)
UFH PPP CHRO-ACNC: 0.44 IU/ML
WBC # BLD AUTO: 8.3 10E9/L (ref 4–11)

## 2021-04-07 PROCEDURE — 250N000013 HC RX MED GY IP 250 OP 250 PS 637: Performed by: NURSE PRACTITIONER

## 2021-04-07 PROCEDURE — 99223 1ST HOSP IP/OBS HIGH 75: CPT | Performed by: NURSE PRACTITIONER

## 2021-04-07 PROCEDURE — 84132 ASSAY OF SERUM POTASSIUM: CPT | Performed by: NURSE PRACTITIONER

## 2021-04-07 PROCEDURE — 999N001017 HC STATISTIC GLUCOSE BY METER IP

## 2021-04-07 PROCEDURE — 75561 CARDIAC MRI FOR MORPH W/DYE: CPT

## 2021-04-07 PROCEDURE — A9585 GADOBUTROL INJECTION: HCPCS

## 2021-04-07 PROCEDURE — 80076 HEPATIC FUNCTION PANEL: CPT | Performed by: NURSE PRACTITIONER

## 2021-04-07 PROCEDURE — 250N000011 HC RX IP 250 OP 636: Performed by: PHYSICIAN ASSISTANT

## 2021-04-07 PROCEDURE — 85610 PROTHROMBIN TIME: CPT

## 2021-04-07 PROCEDURE — 80048 BASIC METABOLIC PNL TOTAL CA: CPT | Performed by: NURSE PRACTITIONER

## 2021-04-07 PROCEDURE — 99238 HOSP IP/OBS DSCHRG MGMT 30/<: CPT | Mod: 25 | Performed by: NURSE PRACTITIONER

## 2021-04-07 PROCEDURE — 36415 COLL VENOUS BLD VENIPUNCTURE: CPT

## 2021-04-07 PROCEDURE — 99207 PR APP CREDIT; MD BILLING SHARED VISIT: CPT | Performed by: INTERNAL MEDICINE

## 2021-04-07 PROCEDURE — 83695 ASSAY OF LIPOPROTEIN(A): CPT | Performed by: NURSE PRACTITIONER

## 2021-04-07 PROCEDURE — 255N000002 HC RX 255 OP 636

## 2021-04-07 PROCEDURE — 36415 COLL VENOUS BLD VENIPUNCTURE: CPT | Performed by: NURSE PRACTITIONER

## 2021-04-07 PROCEDURE — 75561 CARDIAC MRI FOR MORPH W/DYE: CPT | Mod: 26 | Performed by: INTERNAL MEDICINE

## 2021-04-07 PROCEDURE — 85520 HEPARIN ASSAY: CPT

## 2021-04-07 PROCEDURE — 250N000013 HC RX MED GY IP 250 OP 250 PS 637: Performed by: PHYSICIAN ASSISTANT

## 2021-04-07 PROCEDURE — 85027 COMPLETE CBC AUTOMATED: CPT | Performed by: NURSE PRACTITIONER

## 2021-04-07 PROCEDURE — 97530 THERAPEUTIC ACTIVITIES: CPT | Mod: GO

## 2021-04-07 RX ORDER — LOSARTAN POTASSIUM 25 MG/1
25 TABLET ORAL DAILY
Qty: 90 TABLET | Refills: 0 | Status: SHIPPED | OUTPATIENT
Start: 2021-04-08 | End: 2021-11-17

## 2021-04-07 RX ORDER — GADOBUTROL 604.72 MG/ML
10 INJECTION INTRAVENOUS ONCE
Status: COMPLETED | OUTPATIENT
Start: 2021-04-07 | End: 2021-04-07

## 2021-04-07 RX ORDER — LOSARTAN POTASSIUM 25 MG/1
25 TABLET ORAL DAILY
Status: DISCONTINUED | OUTPATIENT
Start: 2021-04-07 | End: 2021-04-07 | Stop reason: HOSPADM

## 2021-04-07 RX ORDER — WARFARIN SODIUM 6 MG/1
6 TABLET ORAL
Status: DISCONTINUED | OUTPATIENT
Start: 2021-04-07 | End: 2021-04-07

## 2021-04-07 RX ORDER — POTASSIUM CHLORIDE 750 MG/1
20 TABLET, EXTENDED RELEASE ORAL ONCE
Status: COMPLETED | OUTPATIENT
Start: 2021-04-07 | End: 2021-04-07

## 2021-04-07 RX ORDER — METOPROLOL SUCCINATE 50 MG/1
50 TABLET, EXTENDED RELEASE ORAL DAILY
Qty: 90 TABLET | Refills: 0 | Status: SHIPPED | OUTPATIENT
Start: 2021-04-07

## 2021-04-07 RX ORDER — NITROGLYCERIN 0.4 MG/1
TABLET SUBLINGUAL
Qty: 30 TABLET | Refills: 0 | Status: SHIPPED | OUTPATIENT
Start: 2021-04-07 | End: 2023-07-20

## 2021-04-07 RX ORDER — ATORVASTATIN CALCIUM 40 MG/1
40 TABLET, FILM COATED ORAL EVERY EVENING
Qty: 90 TABLET | Refills: 3 | Status: SHIPPED | OUTPATIENT
Start: 2021-04-07

## 2021-04-07 RX ADMIN — SITAGLIPTIN 100 MG: 100 TABLET, FILM COATED ORAL at 13:58

## 2021-04-07 RX ADMIN — GADOBUTROL 10 ML: 604.72 INJECTION INTRAVENOUS at 13:29

## 2021-04-07 RX ADMIN — INSULIN ASPART 2 UNITS: 100 INJECTION, SOLUTION INTRAVENOUS; SUBCUTANEOUS at 09:26

## 2021-04-07 RX ADMIN — ASPIRIN 81 MG: 81 TABLET, COATED ORAL at 09:25

## 2021-04-07 RX ADMIN — INSULIN ASPART 1 UNITS: 100 INJECTION, SOLUTION INTRAVENOUS; SUBCUTANEOUS at 13:57

## 2021-04-07 RX ADMIN — HEPARIN SODIUM 1100 UNITS/HR: 10000 INJECTION, SOLUTION INTRAVENOUS at 03:44

## 2021-04-07 RX ADMIN — POTASSIUM CHLORIDE 20 MEQ: 750 TABLET, EXTENDED RELEASE ORAL at 09:25

## 2021-04-07 RX ADMIN — METOPROLOL TARTRATE 25 MG: 25 TABLET, FILM COATED ORAL at 09:25

## 2021-04-07 RX ADMIN — TICAGRELOR 90 MG: 90 TABLET ORAL at 09:25

## 2021-04-07 RX ADMIN — LOSARTAN POTASSIUM 25 MG: 25 TABLET, FILM COATED ORAL at 09:25

## 2021-04-07 NOTE — PROGRESS NOTES
Interventional Cardiology Progress Note      Assessment & Plan:  Nighat Reyes is a pleasant 61 year old with past medical history of T2DM, HTN, HLD, and patent ductus arteriosis (surgically repaired) who presents to [facility] with anterior STEMI.    Today's Plan:  - Endocrine consult for hyperglycemia management  - Start losartan 25 mg daily   - Cardiac MRI to further evaluate LV thrombus     # Multi-vessel CAD c/b anterior STEMI  # HFrEF 2/2 ischemic cardiomyopathy   # Possible apical LV thrombus   # Hx of surgically repaired patent ductus arteriosis  Patient presented to ED with >24 hours of intermittent chest discomfort associated with nausea and vomiting. EKG consistent with anterior STEMI with ST elevation in leads V2-V6. Patient was brought emergently to cath lab and underwent coronary angiogram that showed 100% occluded LAD that was treated with HASEEB x 1 with PEGGY 1 flow after treatment with vasodilators. There is also significant disease of mRCA and RPL that would be amenable to PCI. Troponin peak 112. Echo shows reduced LV function with EF 35-40%, the apical anterior wall and apex are akinetic, and the basal septum and mid-apical lateral wall is severely hypokinetic. There is also a small apical LV thrombus that cannot be ruled out. No arrhythmias post-angiogram. Persistent EDUARD on subsequent EKGs likely 2/2 no reflow post-PCI.   - DAPT: aspirin 81 mg daily and Brilinta 90 mg BID  - BB: metoprolol tartrate 25 mg BID  - ACE: start losartan 25 mg daily, decreased from PTA dose of 100 mg d/t soft pressures   - Statin: atorvastatin 40 mg daily, increase as tolerated    - Volume status: euvolemic, will hold off on diuretics   - Continue heparin gtt for possible LV thrombus   - Start coumadin, pharmacy to dose, INR remains subtherapeutic today at 1.16  - Cardiac rehab   - Cardiac MRI today to evaluate LV thrombus   - Repeat echo in 3 months   - Requesting follow-up with cardiology at [place]   - Recommend staged  intervention to RCA and RPL as outpatient in 2-4 weeks     #HTN  Well-controlled. PTA losartan and hydrochlorothiazide. Intermittent episodes of hypotension with SBP 80's.   - Resume losartan, as above  - Stop hydrochlorothiazide   - Continue metoprolol tartrate, as above     # HLD  . No PTA statin. AST 44, ALT 29.   - Continue atorvastatin 40 mg daily, increase as tolerated   - Repeat LFTs in 4-6 weeks  - Repeat lipid panel in 3 months    # T2DM, uncontrolled   A1C 10.9. -300's this admission. PTA sitagliptin and metformin.   - Endocrine consult   - Nutrition consult   - High intensity sliding scale   - Q4 and HS BG checks  - Hypoglycemia protocol     # Acute blood loss anemia   Likely 2/2 to hemodilution and blood loss during procedure. CT abdomen and pelvis negative for RP bleed. Hgb 10.3, stable.   - Daily CBC      Diet: Cardiac   Activity: as tolerated   Code Status: Full   Disposition: likely 1-2 days pending clinical status    Patient seen and discussed w/ Dr. Maldonado. He agrees with the above plan.       Bernice Null DNP, APRN, CNP  [facility]  Interventional Cardiology  [phone]      Interval History: No acute events overnight. On heparin gtt for possible LV thrombus. NSR on telemetry. Denies CP, SOB, dizziness, lightheadedness, PND, or orthopnea. RFA site CDI without bleeding or bruising. SBP 's.     Most recent vital signs:  /70   Pulse 72   Temp 98.4  F (36.9  C) (Oral)   Resp (!) 6   Wt 82.9 kg (182 lb 12.2 oz)   SpO2 99%   BMI 29.50 kg/m    Temp:  [97.5  F (36.4  C)-99.2  F (37.3  C)] 98.4  F (36.9  C)  Pulse:  [72-92] 72  Resp:  [6-32] 6  BP: ()/(51-88) 105/70  SpO2:  [95 %-99 %] 99 %  Wt Readings from Last 2 Encounters:   04/06/21 82.9 kg (182 lb 12.2 oz)   04/04/21 81.6 kg (180 lb)       Intake/Output Summary (Last 24 hours) at 4/6/2021 1046  Last data filed at 4/6/2021 0900  Gross per 24 hour   Intake 550.07 ml   Output 1800  ml   Net -1249.93 ml       Physical exam:  General: In bed, in NAD  HEENT: EOMI, PERRLA, no scleral icterus or injection  CARDIAC: RRR, no m/r/g appreciated. Peripheral pulses 2+  RESP: CTAB, no wheezes, rhonchi or crackles appreciated.  GI: NABS, NT/ND, no guarding or rebound  EXTREMITIES: NO LE edema, pulses 2+. Femoral access site w/o bleeding, dressing c/d/i. No bruits appreciated.   SKIN: No acute lesions appreciated  NEURO: Alert and oriented X3, CN II-XII grossly intact, no focal neurological deficits noted    Drains and Tubes: No drains or tubes present  Access: No central lines or devices in place    Labs (Past three days):  CBC  Recent Labs   Lab 04/06/21  0552 04/05/21  1406 04/05/21  1126 04/05/21  0453 04/05/21  0405 04/04/21  1317   WBC 8.8 10.1  --   --  9.5 11.6*   RBC 3.24* 3.79*  --   --  3.60* 4.66   HGB 10.3* 11.9 10.9* 10.6* 10.8* 14.7   HCT 29.1* 34.4*  --   --  31.8* 41.5   MCV 90 91  --   --  88 89   MCH 31.8 31.4  --   --  30.0 31.5   MCHC 35.4 34.6  --   --  34.0 35.4   RDW 12.7 12.9  --   --  12.7 12.4    211  --   --  212 281     BMP  Recent Labs   Lab 04/07/21  0635 04/06/21  1620 04/06/21  0552 04/05/21  1321 04/05/21  0405 04/04/21  1433 04/04/21  1317   NA  --   --  129*  --  130*  --  130*   POTASSIUM 3.7 4.3 3.3* 3.9 3.5  --  4.8   CHLORIDE  --   --  98  --  98  --  97   CO2  --   --  24  --  24  --  22   ANIONGAP  --   --  7  --  8  --  11   GLC  --   --  190*  --  208* 286* 306*   BUN  --   --  17  --  21  --  16   CR  --   --  0.70  --  0.78  --  0.70   GFRESTIMATED  --   --  >90  --  81  --  >90   GFRESTBLACK  --   --  >90  --  >90  --  >90   LEYLA  --   --  8.6  --  8.4*  --  9.2     Troponins:   Lab Results   Component Value Date    TROPI 41.148 () 04/05/2021    TROPI 70.954 () 04/05/2021    TROPI 112.728 () 04/04/2021    TROPI 102.459 () 04/04/2021    TROPI 19.767 () 04/04/2021        INR  Recent Labs   Lab 04/07/21  0635 04/06/21  0552 04/05/21  1321  04/04/21  1335   INR 1.19* 1.49* 1.04 1.17*     Liver panel  Recent Labs   Lab 04/07/21  0635   PROTTOTAL 6.0*   ALBUMIN 2.7*   BILITOTAL 0.6   ALKPHOS 53   AST 44   ALT 29       Imaging/procedure results:  EKG 12 Lead: 4/5/21      ECHO: 4/6/21  Interpretation Summary  The Ejection Fraction is estimated at 35-40% with mid to apical setpum, the  apical anterior wall and apex are akinetic. The basal septum and mid and  apical lateral wall are is severely hypokinetic.     Can not rule out tiny (0.65cmX0.45cm) apical lv thrombus. Consider additional  imaging or repeat imaging with contrast if able.     Diastolic Doppler findings (E/E' ratio and/or other parameters) suggest left  ventricular filling pressures are increased.     Right ventricular function, chamber size, wall motion, and thickness are  normal.     No pericardial effusion is present.    Coronary Angiogram: 4/4/21  Conclusion         Mid LAD lesion is 100% stenosed.    Mid RCA lesion is 70% stenosed.    1st RPL lesion is 80% stenosed.     Evolved anterior STEMI with no reflow post PCI and PEGGY 1 flow after treatment with vasodilators.  Single 2.75x28 mm HASEEB placement at mid LAD.   RCA has disease that should be addressed if symptoms or ischemia is present

## 2021-04-07 NOTE — DISCHARGE INSTRUCTIONS
"  Going home after a Heart Attack with Stent Placement    PROCEDURE SITE:  It is normal to have soreness, mild bruising or a small lump at the puncture site. You may shower but please do not use a hot tub, bath tub or pool for 2 days. Do not apply any lotion or powder near the site for 2 days. For 2 days when you cough, sneeze or push with a bowel movement place your hand over the puncture site and apply gentle pressure. For the first 2 days avoid squatting. Avoid lifting more than 10 pounds for at least 5 days. Further activity restrictions as below. If you feel a \"pop\" with pain and/or notice significant increase in pain, swelling or bleeding from the site- immediately lie down, press firmly on the site and proceed to the nearest medical facility.    ACTIVITY:  Keep in mind everyone will recover at a different pace. This can be related to your activity level prior to the heart attack as well as how much damage your heart attack caused. Eventually the goal per the American [employer] Association is 30 minutes of moderate exercise 5 times a week. In the first 2 weeks it is best for all patient to avoid strenuous/vigorous exercise including sex.    MEDICATIONS:  1. You have begun Brilinta (ticagrelor). This medication is essential for your stent(s). Do not stop it without speaking first to your heart doctor or their nurse- this includes if you have concerns about side effects or cost. Also, if another health provider tells you to stop it, let them know they need to discuss it with your heart doctor.   2. If you are on Metformin (Glucophage) or any medications that contain this, you should not take it for at least 48 hours (2 days) from the time of your procedure. If you have baseline kidney problems, you may be instructed to also have a lab test drawn in 2-3 days before getting the okay to restart it.  3. If you have not been continued on other medications you were previously taking at home it is probably for reason " though please discuss your concerns with any of your doctors.     DIET:  We recommend a diet low in saturated fat, trans fat and cholesterol. In addition it will be helpful to be cautious of sodium intake and carbohydrates. Try to increase the amount of lean meats you eat like fish and chicken, but avoid frying; and reduce the amount of red meat you eat. Eat more fresh fruits and vegetables and try to avoid canned and processed food. Please reference the handouts you received for more specific information.    CARDIAC REHAB:  After the initial healing process of the procedure site, we recommend cardiac rehabilitation for all heart attack and stent patients. Cardiac rehabilitation will help you:  - Rebuild stamina, strength and balance.  - Learn how to participate in activities safely, as well as help you regain confidence to do so.  - Return to activities of daily living and leisure.  You should receive a call from them within the next week, but if you do not or you would like to call you can contact their central scheduling at [phone]    OTHER INFORMATION:  1. Consider having your family members learn CPR if they do not know it already.  2. It's not uncommon for heart attack sufferers to experience feelings of depression, anxiety or denial. Please do not be afraid to discuss these symptoms with any of your health providers at any point in your recovery.  3. If you are a smoker, quitting smoking will be one of the most important things you can do for yourself. There are nicotine replacement options or medications they might be able to be prescribed. Please discuss this with your doctors. Consider calling the [employer]uit[employer] at [phone] ([phone]) as they can offer ongoing support after discharge.    CALL YOUR DOCTOR IF:  -You have a large or growing lump/bump around the procedure site  -The site is red, swollen, hot, tender or has drainage  -You have hives, a rash or unusual itching  -You have increasing or worsening  shortness of breath or chest pain    FOLLOW UP:  We prefer you to follow up with your primary care provider within one week. You will be arranged to see the cardiologist (heart doctor) in clinic in about one month.     Should you need to contact us:  Cardiology clinic for scheduling or triage nurse questions/concerns:       Heart Follow Up: 4/22/21 with Dr. Sandoval     After hours contact number [phone] option 2.     DEMETRIUS Kennedy, CNP  4/7/2021

## 2021-04-07 NOTE — DISCHARGE SUMMARY
[address] [address]  p: [phone]    Discharge Summary: Cardiology Service    Nighat Ryees MRN# [id number]   YOB: 1960 Age: 61 year old       Admission Date: 4/4/2021  Discharge Date: 04/07/21    Discharge Diagnoses:  1. Anterior STEMI  2. Multi-vessel CAD  3. HFrEF 2/2 ICM  4. HLD  5. HTN  6. Uncontrolled T2DM  7. Acute blood loss anemia  8. Chronic mild hyponatremia   9. Hx of surgically repaired PDA     Brief HPI:  Nighat Reyes is a pleasant 61 year old with history of T2DM, HTN, HLD, and surgically repaired PDA who presented to [facility] ED with > 24 hours of chest pain associated with nausea and vomiting on 4/4. EKG was consistent with anterior STEMI. She was transferred to [facility] and underwent emergent coronary angiogram that revealed multi-vessel disease with 100% occluded culprit LAD lesion as well 70% mRCA and 80% RPLA lesion.    Hospital Course by Diagnosis:  # Delayed presentation anterior STEMI  # Multi-vessel CAD  # HFrEF 2/2 ischemic cardiomyopathy (EF 44%)    She is now s/p HASEEB x 1 to the LAD with no-flow post PCI and PEGGY 1 flow after vasodilators. There is also significant disease of the mRCA and RPL that should be staged in the near future. Troponin peaked at 112. Echo showed reduced LV function with EF 35-40% with concerns of small apical LV thrombus. Subsequent cardiac MRI showed NO intracardiac thrombus with large area of apical akinesis and LVEF of 44%. There was also microvascular obstruction and myocardial edema involving the apical segments consistent with anterior MI. There is 30% viability of LAD and 100% viability of RCA. Persistent EDUARD on subsequent EKGs likely 2/2 no reflow post-PCI.   - DAPT: aspirin 81 mg daily and Brilinta 90 mg BID for at least one year   - BB: Toprol XL 50 mg daily   - ACE/ARB: Losartan 25 mg daily [ mg daily]   - Statin: atorvastatin 40 mg daily, increase as tolerated     - Volume status: euvolemic, will hold off on diuretics at this time given soft blood pressures. Can re-evaluate in outpatient setting.    - Outpatient cardiac rehab   - Repeat echo in 3 months   - Recommend staged intervention of mRCA and RPL, can discuss at cardiology follow-up with Dr. Snadoval at [place] on 4/22/21     #HTN  SBP 's this admission, asymptomatic.   - Losartan, as above    - Toprol XL, as above  - Stop PTA hydrochlorothiazide       # HLD  . No PTA statin. AST 44, ALT 29.   - Continue atorvastatin 40 mg daily, increase as tolerated   - Repeat LFTs in 4-6 weeks  - Repeat lipid panel in 3 months     # Uncontrolled Type II DM    A1C 10.9. -300's this admission. PTA sitagliptin and metformin. Endocrine consulted for hyperglycemia management. Sliding scale insulin while inpatient with hypoglycemia protocol.   - Resume PTA metformin, sitagliptin, and Lantus  - Recommend close follow-up with PCP to optimize diabetes management     # Acute blood loss anemia   Likely 2/2 to hemodilution and blood loss during procedure. CT abdomen and pelvis negative for RP bleed. Hgb 10.3, stable.   - Repeat CBC in 1 week     # Chronic mild hyponatremia, asymptomatic  Na 129-130 this admission. Unclear etiology. Possibly hyperglycemia-induced hyponatremia in the setting of high BG and uncontrolled DM. Although less likely, other differentials include thiazide-induced vs hypovolemia hyponatremia. No other s/s of hypovolemia. Soft BP, but asymptomatic. No diuretic use. Hydrochlorothiazide discontinued.   - Repeat BMP in 1 week     # Hx of patent ductus arteriosis  - Surgically repaired years ago     Pertinent Procedures:  1. Coronary angiogram with percutaneous intervention  2. Cardiac MRI    Consults:  - Endocrine     Medication Changes:  - See below     Discharge medications:   Discharge Medication List as of 4/7/2021  5:27 PM      START taking these medications    Details   aspirin (ASA) 81 MG EC tablet Take 1  tablet (81 mg) by mouth daily, Disp-90 tablet, R-3, E-Prescribe      atorvastatin (LIPITOR) 40 MG tablet Take 1 tablet (40 mg) by mouth every evening, Disp-90 tablet, R-3, E-Prescribe      metoprolol succinate ER (TOPROL-XL) 50 MG 24 hr tablet Take 1 tablet (50 mg) by mouth daily, Disp-90 tablet, R-0, E-Prescribe      nitroGLYcerin (NITROSTAT) 0.4 MG sublingual tablet For chest pain place 1 tablet under the tongue every 5 minutes for 3 doses. If symptoms persist 5 minutes after 1st dose call 911., Disp-30 tablet, R-0, E-Prescribe      ticagrelor (BRILINTA) 90 MG tablet Take 1 tablet (90 mg) by mouth every 12 hours, Disp-180 tablet, R-3, E-Prescribe         CONTINUE these medications which have CHANGED    Details   losartan (COZAAR) 25 MG tablet Take 1 tablet (25 mg) by mouth daily, Disp-90 tablet, R-0, E-Prescribe         CONTINUE these medications which have NOT CHANGED    Details   insulin glargine (BASAGLAR KWIKPEN) 100 UNIT/ML pen Inject 18 Units Subcutaneous At Bedtime, HistoricalIf Basaglar is not covered by insurance, may substitute Lantus at same dose and frequency.        metFORMIN (GLUCOPHAGE) 1000 MG tablet Take 1,000 mg by mouth 2 times daily (with meals), Historical      sitagliptin (JANUVIA) 100 MG tablet Take 100 mg by mouth daily, Historical         STOP taking these medications       hydrochlorothiazide (HYDRODIURIL) 25 MG tablet Comments:   Reason for Stopping:               Follow-up:  - PCP in 1 week   - Cardiology follow-up with Dr. Sandoval at [place] on 4/22    Labs or imaging requiring follow-up after discharge:  - BMP/CBC in 1 week   - Recommend echo in 3 months     Code status:  - Full     Condition on discharge  Temp:  [98.4  F (36.9  C)-98.6  F (37  C)] 98.5  F (36.9  C)  Pulse:  [72-92] 92  Resp:  [6-26] 26  BP: ()/(51-73) 109/73  SpO2:  [95 %-99 %] 98 %  General: Alert, interactive, NAD  Eyes: sclera anicteric, EOMI  Neck: JVP 0, carotid 2+ bilaterally  Cardiovascular: regular rate and  rhythm, normal S1 and S2, no murmurs, gallops, or rubs  Resp: clear to auscultation bilaterally, no rales, wheezes, or rhonchi  GI: Soft, nontender, nondistended. +BS.  No HSM or masses, no rebound or guarding.  Extremities: No edema, no cyanosis or clubbing, dorsalis pedis and posterior tibialis pulses 2+ bilaterally  Skin: Warm and dry, no jaundice or rash  Neuro: CN 2-12 intact, moves all extremities equally  Psych: Alert & oriented x 3    Imaging with results:  Cardiac MRI: 4/7/21  Clinical history: 61 F ant STEMI delayed presentation, LAD PCI with no reflow, ischemic CM, possible LV  apical thrombus on echo  Comparison CMR: none     1. The LV is normal in cavity size and wall thickness. The global systolic function is moderately reduced.  The LVEF is 44%. There is a large area of apical akinesis.     2. The RV is normal in cavity size. The global systolic function is normal. The RVEF is 59%.      3. The left atrium is moderately dilated.     4. There is no significant valvular disease.      5. Late gadolinium enhancement imaging shows extensive transmural enhancement involving the apical  segments. There is also micro-vascular obstruction and myocardial edema noted in these segments. This is  consistent with a recent LAD MI. There is 30% viability in the LAD, and 100% viability in the RCA and  circumflex.      6. There is a trivial to small pericardial effusion.     7. There is no intracardiac thrombus.     CONCLUSIONS:  Ischemic CM, LVEF 44% and RVEF 59%. Acute large LAD MI with 30% viability. No LV thrombus.     Echocardiogram: 4/5/21  Interpretation Summary  The Ejection Fraction is estimated at 35-40% with mid to apical setpum, the  apical anterior wall and apex are akinetic. The basal septum and mid and  apical lateral wall are is severely hypokinetic.     Can not rule out tiny (0.65cmX0.45cm) apical lv thrombus. Consider additional  imaging or repeat imaging with contrast if able.    Coronary Angiogram:  4/4/21  Conclusion         Mid LAD lesion is 100% stenosed.    Mid RCA lesion is 70% stenosed.    1st RPL lesion is 80% stenosed.     Evolved anterior STEMI with no reflow post PCI and PEGGY 1 flow after treatment with vasodilators.  Single 2.75x28 mm HASEEB placement at mid LAD.   RCA has disease that should be addressed if symptoms or ischemia is present     Patient Care Team:  Cady Lay as PCP - General (Family Medicine)    Bernice Null DNP, APRN, CNP  [facility]  [phone]

## 2021-04-07 NOTE — DISCHARGE SUMMARY
Pt stable upon discharge. All education completed per AVS and additional materials. All questions answered.  present for discharge teaching.

## 2021-04-07 NOTE — PLAN OF CARE
Problem: Adult Inpatient Plan of Care  Goal: Absence of Hospital-Acquired Illness or Injury  Intervention: Identify and Manage Fall Risk  Recent Flowsheet Documentation  Taken 4/7/2021 0400 by MARCIE VERONICA  Safety Promotion/Fall Prevention: activity supervised  Taken 4/7/2021 0000 by MARCIE VERONICA  Safety Promotion/Fall Prevention: activity supervised  Taken 4/6/2021 2000 by MARCIE VERONICA  Safety Promotion/Fall Prevention: activity supervised  Intervention: Prevent Skin Injury  Recent Flowsheet Documentation  Taken 4/7/2021 0400 by MARCIE VERONICA  Body Position: position changed independently  Taken 4/7/2021 0000 by MARCIE VERONICA  Body Position: position changed independently  Taken 4/6/2021 2000 by MARCIE VERONICA  Body Position: position changed independently  Intervention: Prevent and Manage VTE (Venous Thromboembolism) Risk  Recent Flowsheet Documentation  Taken 4/7/2021 0400 by MARCIE VERONICA  VTE Prevention/Management:   ambulation promoted   anticoagulant therapy maintained  Taken 4/7/2021 0000 by MARCIE VERONICA  VTE Prevention/Management:   ambulation promoted   anticoagulant therapy maintained  Taken 4/6/2021 2000 by MARCIE VERONICA  VTE Prevention/Management:   ambulation promoted   anticoagulant therapy maintained

## 2021-04-07 NOTE — CONSULTS
Brief Care Management Note    Length of Stay (days): 3    Expected Discharge Date: 04/08     Concerns to be Addressed: Discharge planning, medical readiness   Patient plan of care discussed at interdisciplinary rounds: Yes    Anticipated Discharge Disposition:  Home  Anticipated Discharge Services:  OP OT  Anticipated Discharge DME:  None    Additional Information:  Writer attempted to complete brief care management assessment with patient, she is currently out of the room for MRI. Per the primary team, MRI will determine if patient will need to continue warfarin at discharge. Per chart review, patient has not been on warfarin prior to admission. Patient's primary care provider is Cady Felder at the [facility], EMR updated. RNCC will f/u with patient regarding possible new warfarin pending MRI results.     Chinyere Bansal, RNCC, BSN    [facility]  [address] [place] [id number]    [email or web address]    Office: [phone] Pager: [phone]  To contact the weekend RNCC, page [phone].

## 2021-04-07 NOTE — PROGRESS NOTES
VSS,  BP somewhat soft after receiving metoprolol. Patient reports significant difficulty sleeping.  No other complaints.  Zhane Buckley RN on 4/7/2021 at 7:51 AM

## 2021-04-07 NOTE — PLAN OF CARE
Occupational Therapy and Cardiac Rehab Discharge Summary    Reason for therapy discharge:    Discharged to home with outpatient therapy.    Progress towards therapy goal(s). See goals on Care Plan in Epic electronic health record for goal details.  Goals partially met.  Barriers to achieving goals:   discharge from facility.    Therapy recommendation(s):    Continued therapy is recommended.  Rationale/Recommendations:  OP Phase II CR at [facility] in [place].

## 2021-04-08 LAB — LPA SERPL-MCNC: 43 MG/DL

## 2021-04-09 NOTE — PROGRESS NOTES
Attempted post discharge call  No answer and No voice mail  No further calls will be made at this time

## 2021-04-22 ENCOUNTER — OFFICE VISIT (OUTPATIENT)
Dept: CARDIOLOGY | Facility: CLINIC | Age: 61
End: 2021-04-22
Attending: NURSE PRACTITIONER
Payer: COMMERCIAL

## 2021-04-22 VITALS
SYSTOLIC BLOOD PRESSURE: 110 MMHG | BODY MASS INDEX: 28.69 KG/M2 | WEIGHT: 178.5 LBS | HEIGHT: 66 IN | DIASTOLIC BLOOD PRESSURE: 64 MMHG | HEART RATE: 84 BPM

## 2021-04-22 DIAGNOSIS — I21.02 ST ELEVATION MYOCARDIAL INFARCTION INVOLVING LEFT ANTERIOR DESCENDING (LAD) CORONARY ARTERY (H): Primary | ICD-10-CM

## 2021-04-22 DIAGNOSIS — Z11.59 ENCOUNTER FOR SCREENING FOR OTHER VIRAL DISEASES: ICD-10-CM

## 2021-04-22 DIAGNOSIS — I25.10 CORONARY ARTERY DISEASE INVOLVING NATIVE HEART, ANGINA PRESENCE UNSPECIFIED, UNSPECIFIED VESSEL OR LESION TYPE: ICD-10-CM

## 2021-04-22 PROCEDURE — 99204 OFFICE O/P NEW MOD 45 MIN: CPT | Performed by: INTERNAL MEDICINE

## 2021-04-22 ASSESSMENT — MIFFLIN-ST. JEOR: SCORE: 1381.42

## 2021-04-22 NOTE — PROGRESS NOTES
Service Date: 04/22/2021    REASON FOR CONSULTATION:  Post-hospitalization followup for acute MI.    HISTORY OF PRESENT ILLNESS:  The patient is a delightful 61-year-old female with known coronary artery disease, hypertension, hyperlipidemia and type 2 diabetes, who was hospitalized earlier this year with anterior ST-elevation MI.      She presented to [facility] Emergency Department with greater than 24 hours of chest pain with associated nausea and vomiting.  EKG showed anterior STEMI.  She was transferred to the [facility] where emergent coronary angiogram was performed.  This revealed thrombotically occluded mid LAD, which was successfully stented.  She had mild to moderate circumflex disease and severe right PDA as well as mid RCA stenoses.    Echocardiogram post-MI showed moderate to severe LV systolic dysfunction with an EF of 30%-35% as well as possible small apical thrombus.  A subsequent cardiac MRI during her stay in the hospital showed ischemic cardiomyopathy with an EF of 44% and no LV thrombus.    The patient is on an excellent medical program, which includes dual-antiplatelet therapy, statin and beta blocker and ARB.  She has not participated in Cardiac Rehab yet, however, with her usual activities, she does not endorse any significant cardiac symptoms.  No chest pain, palpitations, presyncope or syncope.    IMPRESSION AND PLAN:     1.  Recent anterior STEMI, status post mid LAD stenting.  2.  Residual high-grade RCA disease.  3.  Type 2 diabetes.  4.  Hypertension.  5.  Hyperlipidemia.    I reviewed the patient's recent coronary angiogram as well as cardiac imaging, including echo and a cardiac MRI.  She has residual high-grade right PDA and mid RCA stenoses.  She will benefit from staged revascularization of the RCA disease.  We will schedule the procedure for her in the upcoming 1-2 weeks.      She will continue current medical program, which includes  dual-antiplatelet therapy.  Her last lipid profile before her MI showed an LDL of 173.  I suspect she will need a higher dose of the atorvastatin, but will have her repeat another lipid profile in approximately 1-2 months.    I appreciate the opportunity to participate in her care.    Alli Sandoval MD        D: 2021   T: 2021   MT: DANIELE    Name:     LEONARD GILLETTE  MRN:      [id number]        Account:      [id number]   :      1960           Service Date: 2021       Document: [id number]

## 2021-04-22 NOTE — PROGRESS NOTES
HPI and Plan:   See dictation    No orders of the defined types were placed in this encounter.      No orders of the defined types were placed in this encounter.      There are no discontinued medications.      Encounter Diagnoses   Name Primary?     ST elevation myocardial infarction involving left anterior descending (LAD) coronary artery (H) Yes     Coronary artery disease involving native heart, angina presence unspecified, unspecified vessel or lesion type        CURRENT MEDICATIONS:  Current Outpatient Medications   Medication Sig Dispense Refill     aspirin (ASA) 81 MG EC tablet Take 1 tablet (81 mg) by mouth daily 90 tablet 3     atorvastatin (LIPITOR) 40 MG tablet Take 1 tablet (40 mg) by mouth every evening 90 tablet 3     insulin glargine (BASAGLAR KWIKPEN) 100 UNIT/ML pen Inject 18 Units Subcutaneous At Bedtime       losartan (COZAAR) 25 MG tablet Take 1 tablet (25 mg) by mouth daily 90 tablet 0     metFORMIN (GLUCOPHAGE) 1000 MG tablet Take 1,000 mg by mouth 2 times daily (with meals)       metoprolol succinate ER (TOPROL-XL) 50 MG 24 hr tablet Take 1 tablet (50 mg) by mouth daily 90 tablet 0     nitroGLYcerin (NITROSTAT) 0.4 MG sublingual tablet For chest pain place 1 tablet under the tongue every 5 minutes for 3 doses. If symptoms persist 5 minutes after 1st dose call 911. 30 tablet 0     sitagliptin (JANUVIA) 100 MG tablet Take 100 mg by mouth daily       ticagrelor (BRILINTA) 90 MG tablet Take 1 tablet (90 mg) by mouth every 12 hours 180 tablet 3       ALLERGIES     Allergies   Allergen Reactions     Lisinopril        PAST MEDICAL HISTORY:  Past Medical History:   Diagnosis Date     Diabetes mellitus, type II (H) 04/14/2006    Uncontrolled     Essential hypertension 04/14/2006     Hyperlipidemia 04/14/2006     Ischemic cardiomyopathy      Patent ductus arteriosus     s/p surgical repair     ST elevation MI (STEMI) (H) 04/04/2021    s/p HASEEB to mid LAD     ST elevation myocardial infarction involving  left anterior descending (LAD) coronary artery (H) 04/04/2021     Tobacco use        PAST SURGICAL HISTORY:  Past Surgical History:   Procedure Laterality Date     CV CORONARY ANGIOGRAM N/A 4/4/2021    Procedure: Coronary Angiogram;  Surgeon: Yusuf Maldonado MD;  Location: [facility] CARDIAC CATH LAB     CV PCI STENT DRUG ELUTING N/A 4/4/2021    Procedure: Percutaneous Coronary Intervention Stent Drug Eluting;  Surgeon: Yusuf Maldonado MD;  Location: [facility] CARDIAC CATH LAB       FAMILY HISTORY:  Family History   Problem Relation Age of Onset     No Known Problems Mother      No Known Problems Father      No Known Problems Brother      No Known Problems Sister      Hypertension Son        SOCIAL HISTORY:  Social History     Socioeconomic History     Marital status:      Spouse name: None     Number of children: None     Years of education: None     Highest education level: None   Occupational History     None   Social Needs     Financial resource strain: None     Food insecurity     Worry: None     Inability: None     Transportation needs     Medical: None     Non-medical: None   Tobacco Use     Smoking status: Never Smoker     Smokeless tobacco: Never Used   Substance and Sexual Activity     Alcohol use: Yes     Comment: 3 drinks per week     Drug use: None     Sexual activity: None   Lifestyle     Physical activity     Days per week: None     Minutes per session: None     Stress: None   Relationships     Social connections     Talks on phone: None     Gets together: None     Attends Muslim service: None     Active member of club or organization: None     Attends meetings of clubs or organizations: None     Relationship status: None     Intimate partner violence     Fear of current or ex partner: None     Emotionally abused: None     Physically abused: None     Forced sexual activity: None   Other Topics Concern     Parent/sibling w/ CABG, MI or angioplasty before 65F 55M? Not Asked   Social  "History Narrative     None       Review of Systems:  Skin:  Negative       Eyes:  Positive for contacts    ENT:  Negative      Respiratory:  Positive for cough     Cardiovascular:  Negative      Gastroenterology: Negative      Genitourinary:  Negative      Musculoskeletal:  Negative      Neurologic:  Positive for headaches    Psychiatric:  Negative      Heme/Lymph/Imm:    allergies    Endocrine:    diabetes      Physical Exam:  Vitals: /64   Pulse 84   Ht 1.676 m (5' 6\")   Wt 81 kg (178 lb 8 oz)   BMI 28.81 kg/m      Constitutional:  cooperative;in no acute distress        Skin:  warm and dry to the touch          Head:  normocephalic        Eyes:  conjunctivae and lids unremarkable        Lymph:      ENT:  no pallor or cyanosis        Neck:  JVP normal;no carotid bruit        Respiratory:  clear to auscultation         Cardiac: regular rhythm;no murmurs, gallops or rubs detected                pulses full and equal                                        GI:  abdomen soft;non-tender        Extremities and Muscular Skeletal:  no edema              Neurological:  no gross motor deficits        Psych:  Alert and Oriented x 3        CC  Bernice Null, CNP  [address] [place] [address]              "

## 2021-04-22 NOTE — LETTER
4/22/2021    JEAN  NAEL  [address]  [place] [address]    RE: Nighat Reyes       Dear Colleague,    I had the pleasure of seeing Nighat Reyes in the [facility].    HPI and Plan:   See dictation    No orders of the defined types were placed in this encounter.      No orders of the defined types were placed in this encounter.      There are no discontinued medications.      Encounter Diagnoses   Name Primary?     ST elevation myocardial infarction involving left anterior descending (LAD) coronary artery (H) Yes     Coronary artery disease involving native heart, angina presence unspecified, unspecified vessel or lesion type        CURRENT MEDICATIONS:  Current Outpatient Medications   Medication Sig Dispense Refill     aspirin (ASA) 81 MG EC tablet Take 1 tablet (81 mg) by mouth daily 90 tablet 3     atorvastatin (LIPITOR) 40 MG tablet Take 1 tablet (40 mg) by mouth every evening 90 tablet 3     insulin glargine (BASAGLAR KWIKPEN) 100 UNIT/ML pen Inject 18 Units Subcutaneous At Bedtime       losartan (COZAAR) 25 MG tablet Take 1 tablet (25 mg) by mouth daily 90 tablet 0     metFORMIN (GLUCOPHAGE) 1000 MG tablet Take 1,000 mg by mouth 2 times daily (with meals)       metoprolol succinate ER (TOPROL-XL) 50 MG 24 hr tablet Take 1 tablet (50 mg) by mouth daily 90 tablet 0     nitroGLYcerin (NITROSTAT) 0.4 MG sublingual tablet For chest pain place 1 tablet under the tongue every 5 minutes for 3 doses. If symptoms persist 5 minutes after 1st dose call 911. 30 tablet 0     sitagliptin (JANUVIA) 100 MG tablet Take 100 mg by mouth daily       ticagrelor (BRILINTA) 90 MG tablet Take 1 tablet (90 mg) by mouth every 12 hours 180 tablet 3       ALLERGIES     Allergies   Allergen Reactions     Lisinopril        PAST MEDICAL HISTORY:  Past Medical History:   Diagnosis Date     Diabetes mellitus, type II (H) 04/14/2006    Uncontrolled     Essential  hypertension 04/14/2006     Hyperlipidemia 04/14/2006     Ischemic cardiomyopathy      Patent ductus arteriosus     s/p surgical repair     ST elevation MI (STEMI) (H) 04/04/2021    s/p HASEEB to mid LAD     ST elevation myocardial infarction involving left anterior descending (LAD) coronary artery (H) 04/04/2021     Tobacco use        PAST SURGICAL HISTORY:  Past Surgical History:   Procedure Laterality Date     CV CORONARY ANGIOGRAM N/A 4/4/2021    Procedure: Coronary Angiogram;  Surgeon: Yusuf Maldonado MD;  Location: [facility] CARDIAC CATH LAB     CV PCI STENT DRUG ELUTING N/A 4/4/2021    Procedure: Percutaneous Coronary Intervention Stent Drug Eluting;  Surgeon: Yusuf Maldonado MD;  Location: [facility] CARDIAC CATH LAB       FAMILY HISTORY:  Family History   Problem Relation Age of Onset     No Known Problems Mother      No Known Problems Father      No Known Problems Brother      No Known Problems Sister      Hypertension Son        SOCIAL HISTORY:  Social History     Socioeconomic History     Marital status:      Spouse name: None     Number of children: None     Years of education: None     Highest education level: None   Occupational History     None   Social Needs     Financial resource strain: None     Food insecurity     Worry: None     Inability: None     Transportation needs     Medical: None     Non-medical: None   Tobacco Use     Smoking status: Never Smoker     Smokeless tobacco: Never Used   Substance and Sexual Activity     Alcohol use: Yes     Comment: 3 drinks per week     Drug use: None     Sexual activity: None   Lifestyle     Physical activity     Days per week: None     Minutes per session: None     Stress: None   Relationships     Social connections     Talks on phone: None     Gets together: None     Attends Bahai service: None     Active member of club or organization: None     Attends meetings of clubs or organizations: None     Relationship status: None     Intimate  "partner violence     Fear of current or ex partner: None     Emotionally abused: None     Physically abused: None     Forced sexual activity: None   Other Topics Concern     Parent/sibling w/ CABG, MI or angioplasty before 65F 55M? Not Asked   Social History Narrative     None       Review of Systems:  Skin:  Negative       Eyes:  Positive for contacts    ENT:  Negative      Respiratory:  Positive for cough     Cardiovascular:  Negative      Gastroenterology: Negative      Genitourinary:  Negative      Musculoskeletal:  Negative      Neurologic:  Positive for headaches    Psychiatric:  Negative      Heme/Lymph/Imm:    allergies    Endocrine:    diabetes      Physical Exam:  Vitals: /64   Pulse 84   Ht 1.676 m (5' 6\")   Wt 81 kg (178 lb 8 oz)   BMI 28.81 kg/m      Constitutional:  cooperative;in no acute distress        Skin:  warm and dry to the touch          Head:  normocephalic        Eyes:  conjunctivae and lids unremarkable        Lymph:      ENT:  no pallor or cyanosis        Neck:  JVP normal;no carotid bruit        Respiratory:  clear to auscultation         Cardiac: regular rhythm;no murmurs, gallops or rubs detected                pulses full and equal                                        GI:  abdomen soft;non-tender        Extremities and Muscular Skeletal:  no edema              Neurological:  no gross motor deficits        Psych:  Alert and Oriented x 3      Thank you for allowing me to participate in the care of your patient.      Sincerely,     Alli Sandoval MD, MD     [facility]    cc:   Bernice Null, CNP  [address] [place] [address]        "

## 2021-04-27 ENCOUNTER — TELEPHONE (OUTPATIENT)
Dept: CARDIOLOGY | Facility: CLINIC | Age: 61
End: 2021-04-27

## 2021-04-27 DIAGNOSIS — I21.02 ST ELEVATION MYOCARDIAL INFARCTION INVOLVING LEFT ANTERIOR DESCENDING (LAD) CORONARY ARTERY (H): Primary | ICD-10-CM

## 2021-04-27 DIAGNOSIS — I25.10 CORONARY ARTERY DISEASE INVOLVING NATIVE HEART, ANGINA PRESENCE UNSPECIFIED, UNSPECIFIED VESSEL OR LESION TYPE: Primary | ICD-10-CM

## 2021-04-27 DIAGNOSIS — I25.10 CORONARY ARTERY DISEASE INVOLVING NATIVE HEART, ANGINA PRESENCE UNSPECIFIED, UNSPECIFIED VESSEL OR LESION TYPE: ICD-10-CM

## 2021-04-27 RX ORDER — POTASSIUM CHLORIDE 1500 MG/1
20 TABLET, EXTENDED RELEASE ORAL
Status: CANCELLED | OUTPATIENT
Start: 2021-04-27

## 2021-04-27 RX ORDER — LIDOCAINE 40 MG/G
CREAM TOPICAL
Status: CANCELLED | OUTPATIENT
Start: 2021-04-27

## 2021-04-27 RX ORDER — SODIUM CHLORIDE 9 MG/ML
INJECTION, SOLUTION INTRAVENOUS CONTINUOUS
Status: CANCELLED | OUTPATIENT
Start: 2021-04-27

## 2021-04-27 NOTE — TELEPHONE ENCOUNTER
[facility] - ANGIOGRAM INSTRUCTIONS:    Nighat Reyes is scheduled for an angiogram at [facility] on 5/3/21. Check in time is at 1100 and procedure time is at 1300.  Advised patient not eat or drink after midnight on 5/3/21.   Patient advised to take morning medications with a sip of water on the day of the procedure, noting the followin. Aspirin: Patient is currently taking 81mg of aspirin, patient advised to continue same dose.   2. Diabetic Medications: Patient to hold Metformin on the day of the procedure and should continue to hold until after we can review the results of BMP lab-orders placed, not scheduled. Patient will hold other oral diabetic medications on the morning of the procedure.  3. Anticoagulants: Patient does not take an anticoagulant.  4. Diuretics: Patient does not take diuretics.  Verified patient does not have an allergy to contrast dye.  Verified patient has someone available to drive them home from the hospital and can stay with them for 24 hours after the procedure. They understand that one visitor may accompany them into the hospital on the day of angiogram.  COVID testing: Scheduled on 21.    Angiogram orders have been signed & held.      Left message for patient requesting call back to review these instructions.    Zhane Cueto RN  [facility]    Patient called back and I went over the instructions with her.  She verbalized understanding.  Will plan on contacting her PCP, Dr. Lay to have her BMP checked post procedure at their office.  Zenon WILLAMS

## 2021-04-30 DIAGNOSIS — Z11.59 ENCOUNTER FOR SCREENING FOR OTHER VIRAL DISEASES: ICD-10-CM

## 2021-04-30 LAB
LABORATORY COMMENT REPORT: NORMAL
SARS-COV-2 RNA RESP QL NAA+PROBE: NEGATIVE
SARS-COV-2 RNA RESP QL NAA+PROBE: NORMAL
SPECIMEN SOURCE: NORMAL
SPECIMEN SOURCE: NORMAL

## 2021-04-30 PROCEDURE — 99207 PR NO CHARGE LOS: CPT

## 2021-04-30 PROCEDURE — U0005 INFEC AGEN DETEC AMPLI PROBE: HCPCS | Performed by: INTERNAL MEDICINE

## 2021-04-30 PROCEDURE — U0003 INFECTIOUS AGENT DETECTION BY NUCLEIC ACID (DNA OR RNA); SEVERE ACUTE RESPIRATORY SYNDROME CORONAVIRUS 2 (SARS-COV-2) (CORONAVIRUS DISEASE [COVID-19]), AMPLIFIED PROBE TECHNIQUE, MAKING USE OF HIGH THROUGHPUT TECHNOLOGIES AS DESCRIBED BY CMS-2020-01-R: HCPCS | Performed by: INTERNAL MEDICINE

## 2021-05-03 ENCOUNTER — HOSPITAL ENCOUNTER (OUTPATIENT)
Facility: CLINIC | Age: 61
Discharge: HOME | End: 2021-05-03
Attending: INTERNAL MEDICINE | Admitting: INTERNAL MEDICINE
Payer: COMMERCIAL

## 2021-05-03 VITALS
OXYGEN SATURATION: 99 % | BODY MASS INDEX: 28.4 KG/M2 | HEART RATE: 67 BPM | WEIGHT: 176.7 LBS | RESPIRATION RATE: 18 BRPM | DIASTOLIC BLOOD PRESSURE: 71 MMHG | TEMPERATURE: 98.1 F | SYSTOLIC BLOOD PRESSURE: 121 MMHG | HEIGHT: 66 IN

## 2021-05-03 DIAGNOSIS — I25.10 CORONARY ARTERY DISEASE INVOLVING NATIVE HEART, ANGINA PRESENCE UNSPECIFIED, UNSPECIFIED VESSEL OR LESION TYPE: ICD-10-CM

## 2021-05-03 DIAGNOSIS — I21.02 ST ELEVATION MYOCARDIAL INFARCTION INVOLVING LEFT ANTERIOR DESCENDING (LAD) CORONARY ARTERY (H): ICD-10-CM

## 2021-05-03 PROBLEM — Z98.890 STATUS POST CORONARY ANGIOGRAM: Status: ACTIVE | Noted: 2021-05-03

## 2021-05-03 LAB
ANION GAP SERPL CALCULATED.3IONS-SCNC: 5 MMOL/L (ref 3–14)
APTT PPP: 26 SEC (ref 22–37)
BUN SERPL-MCNC: 13 MG/DL (ref 7–30)
CALCIUM SERPL-MCNC: 9.5 MG/DL (ref 8.5–10.1)
CHLORIDE SERPL-SCNC: 109 MMOL/L (ref 94–109)
CO2 SERPL-SCNC: 26 MMOL/L (ref 20–32)
CREAT SERPL-MCNC: 0.73 MG/DL (ref 0.52–1.04)
ERYTHROCYTE [DISTWIDTH] IN BLOOD BY AUTOMATED COUNT: 13.8 % (ref 10–15)
GFR SERPL CREATININE-BSD FRML MDRD: 87 ML/MIN/{1.73_M2}
GLUCOSE SERPL-MCNC: 191 MG/DL (ref 70–99)
HCT VFR BLD AUTO: 37.8 % (ref 35–47)
HGB BLD-MCNC: 12.2 G/DL (ref 11.7–15.7)
INR PPP: 0.98 (ref 0.86–1.14)
KCT BLD-ACNC: 290 SEC (ref 75–150)
KCT BLD-ACNC: 416 SEC (ref 75–150)
MCH RBC QN AUTO: 30.1 PG (ref 26.5–33)
MCHC RBC AUTO-ENTMCNC: 32.3 G/DL (ref 31.5–36.5)
MCV RBC AUTO: 93 FL (ref 78–100)
PLATELET # BLD AUTO: 233 10E9/L (ref 150–450)
POTASSIUM SERPL-SCNC: 4.1 MMOL/L (ref 3.4–5.3)
RBC # BLD AUTO: 4.05 10E12/L (ref 3.8–5.2)
SODIUM SERPL-SCNC: 140 MMOL/L (ref 133–144)
WBC # BLD AUTO: 5.5 10E9/L (ref 4–11)

## 2021-05-03 PROCEDURE — 250N000011 HC RX IP 250 OP 636: Performed by: INTERNAL MEDICINE

## 2021-05-03 PROCEDURE — C1725 CATH, TRANSLUMIN NON-LASER: HCPCS | Performed by: INTERNAL MEDICINE

## 2021-05-03 PROCEDURE — 85347 COAGULATION TIME ACTIVATED: CPT

## 2021-05-03 PROCEDURE — 258N000003 HC RX IP 258 OP 636: Performed by: INTERNAL MEDICINE

## 2021-05-03 PROCEDURE — 85730 THROMBOPLASTIN TIME PARTIAL: CPT | Mod: GZ | Performed by: INTERNAL MEDICINE

## 2021-05-03 PROCEDURE — 999N000071 HC STATISTIC HEART CATH LAB OR EP LAB

## 2021-05-03 PROCEDURE — C9600 PERC DRUG-EL COR STENT SING: HCPCS | Mod: RC | Performed by: INTERNAL MEDICINE

## 2021-05-03 PROCEDURE — 80048 BASIC METABOLIC PNL TOTAL CA: CPT | Performed by: INTERNAL MEDICINE

## 2021-05-03 PROCEDURE — 36415 COLL VENOUS BLD VENIPUNCTURE: CPT | Performed by: INTERNAL MEDICINE

## 2021-05-03 PROCEDURE — C1769 GUIDE WIRE: HCPCS | Performed by: INTERNAL MEDICINE

## 2021-05-03 PROCEDURE — 999N000054 HC STATISTIC EKG NON-CHARGEABLE

## 2021-05-03 PROCEDURE — 85610 PROTHROMBIN TIME: CPT | Performed by: INTERNAL MEDICINE

## 2021-05-03 PROCEDURE — C1760 CLOSURE DEV, VASC: HCPCS | Performed by: INTERNAL MEDICINE

## 2021-05-03 PROCEDURE — 92920 PRQ TRLUML C ANGIOP 1ART&/BR: CPT | Performed by: INTERNAL MEDICINE

## 2021-05-03 PROCEDURE — 99153 MOD SED SAME PHYS/QHP EA: CPT | Performed by: INTERNAL MEDICINE

## 2021-05-03 PROCEDURE — 93005 ELECTROCARDIOGRAM TRACING: CPT

## 2021-05-03 PROCEDURE — 93454 CORONARY ARTERY ANGIO S&I: CPT | Performed by: INTERNAL MEDICINE

## 2021-05-03 PROCEDURE — 272N000001 HC OR GENERAL SUPPLY STERILE: Performed by: INTERNAL MEDICINE

## 2021-05-03 PROCEDURE — 250N000009 HC RX 250: Performed by: INTERNAL MEDICINE

## 2021-05-03 PROCEDURE — 999N000184 HC STATISTIC TELEMETRY

## 2021-05-03 PROCEDURE — C9601 PERC DRUG-EL COR STENT BRAN: HCPCS | Mod: RC | Performed by: INTERNAL MEDICINE

## 2021-05-03 PROCEDURE — 36591 DRAW BLOOD OFF VENOUS DEVICE: CPT

## 2021-05-03 PROCEDURE — C1874 STENT, COATED/COV W/DEL SYS: HCPCS | Performed by: INTERNAL MEDICINE

## 2021-05-03 PROCEDURE — 85027 COMPLETE CBC AUTOMATED: CPT | Performed by: INTERNAL MEDICINE

## 2021-05-03 PROCEDURE — C1887 CATHETER, GUIDING: HCPCS | Performed by: INTERNAL MEDICINE

## 2021-05-03 PROCEDURE — C1894 INTRO/SHEATH, NON-LASER: HCPCS | Performed by: INTERNAL MEDICINE

## 2021-05-03 PROCEDURE — 99152 MOD SED SAME PHYS/QHP 5/>YRS: CPT | Performed by: INTERNAL MEDICINE

## 2021-05-03 DEVICE — STENT SYNERGY DRUG ELUTING 3.50X20MM  H7493926020350: Type: IMPLANTABLE DEVICE | Status: FUNCTIONAL

## 2021-05-03 DEVICE — STENT SYNERGY DRUG ELUTING 2.50X12MM  H7493926012250: Type: IMPLANTABLE DEVICE | Status: FUNCTIONAL

## 2021-05-03 RX ORDER — NALOXONE HYDROCHLORIDE 0.4 MG/ML
0.2 INJECTION, SOLUTION INTRAMUSCULAR; INTRAVENOUS; SUBCUTANEOUS
Status: DISCONTINUED | OUTPATIENT
Start: 2021-05-03 | End: 2021-05-03 | Stop reason: HOSPADM

## 2021-05-03 RX ORDER — POTASSIUM CHLORIDE 1500 MG/1
20 TABLET, EXTENDED RELEASE ORAL
Status: DISCONTINUED | OUTPATIENT
Start: 2021-05-03 | End: 2021-05-03 | Stop reason: HOSPADM

## 2021-05-03 RX ORDER — ACETAMINOPHEN 325 MG/1
650 TABLET ORAL EVERY 4 HOURS PRN
Status: DISCONTINUED | OUTPATIENT
Start: 2021-05-03 | End: 2021-05-03 | Stop reason: HOSPADM

## 2021-05-03 RX ORDER — NALOXONE HYDROCHLORIDE 0.4 MG/ML
0.4 INJECTION, SOLUTION INTRAMUSCULAR; INTRAVENOUS; SUBCUTANEOUS
Status: DISCONTINUED | OUTPATIENT
Start: 2021-05-03 | End: 2021-05-03 | Stop reason: HOSPADM

## 2021-05-03 RX ORDER — LIDOCAINE 40 MG/G
CREAM TOPICAL
Status: DISCONTINUED | OUTPATIENT
Start: 2021-05-03 | End: 2021-05-03 | Stop reason: HOSPADM

## 2021-05-03 RX ORDER — ASPIRIN 81 MG/1
81 TABLET, CHEWABLE ORAL ONCE
Status: DISCONTINUED | OUTPATIENT
Start: 2021-05-03 | End: 2021-05-03 | Stop reason: HOSPADM

## 2021-05-03 RX ORDER — OXYCODONE HYDROCHLORIDE 5 MG/1
10 TABLET ORAL EVERY 4 HOURS PRN
Status: DISCONTINUED | OUTPATIENT
Start: 2021-05-03 | End: 2021-05-03 | Stop reason: HOSPADM

## 2021-05-03 RX ORDER — ONDANSETRON 2 MG/ML
4 INJECTION INTRAMUSCULAR; INTRAVENOUS EVERY 6 HOURS PRN
Status: DISCONTINUED | OUTPATIENT
Start: 2021-05-03 | End: 2021-05-03 | Stop reason: HOSPADM

## 2021-05-03 RX ORDER — SODIUM CHLORIDE 9 MG/ML
INJECTION, SOLUTION INTRAVENOUS CONTINUOUS
Status: DISCONTINUED | OUTPATIENT
Start: 2021-05-03 | End: 2021-05-03 | Stop reason: HOSPADM

## 2021-05-03 RX ORDER — NITROGLYCERIN 0.4 MG/1
0.4 TABLET SUBLINGUAL EVERY 5 MIN PRN
Status: DISCONTINUED | OUTPATIENT
Start: 2021-05-03 | End: 2021-05-03 | Stop reason: HOSPADM

## 2021-05-03 RX ORDER — IOPAMIDOL 755 MG/ML
INJECTION, SOLUTION INTRAVASCULAR
Status: DISCONTINUED | OUTPATIENT
Start: 2021-05-03 | End: 2021-05-03 | Stop reason: HOSPADM

## 2021-05-03 RX ORDER — ATROPINE SULFATE 0.1 MG/ML
0.5 INJECTION INTRAVENOUS
Status: DISCONTINUED | OUTPATIENT
Start: 2021-05-03 | End: 2021-05-03 | Stop reason: HOSPADM

## 2021-05-03 RX ORDER — HEPARIN SODIUM 1000 [USP'U]/ML
INJECTION, SOLUTION INTRAVENOUS; SUBCUTANEOUS
Status: DISCONTINUED | OUTPATIENT
Start: 2021-05-03 | End: 2021-05-03 | Stop reason: HOSPADM

## 2021-05-03 RX ORDER — FLUMAZENIL 0.1 MG/ML
0.2 INJECTION, SOLUTION INTRAVENOUS
Status: DISCONTINUED | OUTPATIENT
Start: 2021-05-03 | End: 2021-05-03 | Stop reason: HOSPADM

## 2021-05-03 RX ORDER — HYDRALAZINE HYDROCHLORIDE 20 MG/ML
10 INJECTION INTRAMUSCULAR; INTRAVENOUS EVERY 4 HOURS PRN
Status: DISCONTINUED | OUTPATIENT
Start: 2021-05-03 | End: 2021-05-03 | Stop reason: HOSPADM

## 2021-05-03 RX ORDER — FENTANYL CITRATE 50 UG/ML
INJECTION, SOLUTION INTRAMUSCULAR; INTRAVENOUS
Status: DISCONTINUED | OUTPATIENT
Start: 2021-05-03 | End: 2021-05-03 | Stop reason: HOSPADM

## 2021-05-03 RX ORDER — ONDANSETRON 4 MG/1
4 TABLET, ORALLY DISINTEGRATING ORAL EVERY 6 HOURS PRN
Status: DISCONTINUED | OUTPATIENT
Start: 2021-05-03 | End: 2021-05-03 | Stop reason: HOSPADM

## 2021-05-03 RX ORDER — FENTANYL CITRATE 50 UG/ML
25-50 INJECTION, SOLUTION INTRAMUSCULAR; INTRAVENOUS
Status: ACTIVE | OUTPATIENT
Start: 2021-05-03 | End: 2021-05-03

## 2021-05-03 RX ORDER — METOPROLOL TARTRATE 1 MG/ML
5-10 INJECTION, SOLUTION INTRAVENOUS
Status: DISCONTINUED | OUTPATIENT
Start: 2021-05-03 | End: 2021-05-03 | Stop reason: HOSPADM

## 2021-05-03 RX ORDER — ASPIRIN 81 MG/1
81 TABLET ORAL DAILY
Status: DISCONTINUED | OUTPATIENT
Start: 2021-05-04 | End: 2021-05-03 | Stop reason: HOSPADM

## 2021-05-03 RX ORDER — NITROGLYCERIN 5 MG/ML
VIAL (ML) INTRAVENOUS
Status: DISCONTINUED | OUTPATIENT
Start: 2021-05-03 | End: 2021-05-03 | Stop reason: HOSPADM

## 2021-05-03 RX ORDER — OXYCODONE HYDROCHLORIDE 5 MG/1
5 TABLET ORAL EVERY 4 HOURS PRN
Status: DISCONTINUED | OUTPATIENT
Start: 2021-05-03 | End: 2021-05-03 | Stop reason: HOSPADM

## 2021-05-03 RX ADMIN — SODIUM CHLORIDE: 9 INJECTION, SOLUTION INTRAVENOUS at 12:09

## 2021-05-03 ASSESSMENT — MIFFLIN-ST. JEOR: SCORE: 1373.26

## 2021-05-03 NOTE — PROVIDER NOTIFICATION
Text page to Dr. Sandoval to clarify length of bedrest (ordered for 3 hours, pt's spouse stating he was told by Dr. Sandoval 2 hours).

## 2021-05-03 NOTE — PROGRESS NOTES
PATIENT/VISITOR WELLNESS SCREENING    Step 1 Patient Screening    1. In the last month, have you been in contact with someone who was confirmed or suspected to have Coronavirus/COVID-19? No    2. Do you have the following symptoms?  Fever/Chills? No   Cough? No   Shortness of breath? No   New loss of taste or smell? No  Sore throat? No  Muscle or body aches? No  Headaches? No  Fatigue? No  Vomiting or diarrhea? No    Step 2 Visitor Screening    1. Name of Visitor (1 visitor per patient): pat    2. In the last month, have you been in contact with someone who was confirmed or suspected to have Coronavirus/COVID-19? No    3. Do you have the following symptoms?  Fever/Chills? No   Cough? No   Shortness of breath? No   Skin rash? No   Loss of taste or smell? No  Sore throat? No  Runny or stuffy nose? No  Muscle or body aches? No  Headaches? No  Fatigue? No  Vomiting or diarrhea? No    If the visitor has positive symptoms, notify supervisor/manger  Per policy, the visitor will need to leave the facility     Step 3 Refer to logic grid below for actions    NO SYMPTOM(S)    ACTIONS:  1. Standard rooming process  2. Provider to assess per normal protocol  3. Implement precautions as needed and per guidelines     POSITIVE SYMPTOM(S)  If positive for ANY of the following symptoms: fever, cough, shortness of breath, rash    ACTION:  1. Continue to have the patient wear a mask   2. Room patient as soon as possible  3. Don appropriate PPE when entering room  4. Provider evaluation

## 2021-05-03 NOTE — DISCHARGE SUMMARY
Care Suites Discharge Nursing Note    Patient Information  Name: Nighat Reyes  Age: 61 year old    Discharge Education:  Discharge instructions reviewed: Yes  Additional education/resources provided: AVS  Patient/patient representative verbalizes understanding: Yes  Patient discharging on new medications: No  Medication education completed: N/A    Discharge Plans:   Discharge location: home  Discharge ride contacted: Yes  Approximate discharge time: 1800    Discharge Criteria:  Discharge criteria met and vital signs stable: Yes    Patient Belongs:  Patient belongings returned to patient: Yes    Tk Smith RN

## 2021-05-03 NOTE — DISCHARGE INSTRUCTIONS

## 2021-05-03 NOTE — PROGRESS NOTES
Care Suites Admission Nursing Note    Patient Information  Name: Nighat Reyes  Age: 61 year old  Reason for admission: heart cath  Care Suites arrival time: 1115    Visitor Information  Name: Pat  Informed of visitor restrictions: Yes  1 visitor allowed per patient   Visitor must screen negative for COVID symptoms   Visitor must wear a mask  Waiting rooms closed to visitors    Patient Admission/Assessment   Pre-procedure assessment complete: Yes  If abnormal assessment/labs, provider notified: Yes  NPO: Yes  Medications held per instructions/orders: Yes  Consent: deferred  If applicable, pregnancy test status: deferred  Patient oriented to room: Yes  Education/questions answered: Yes  Plan/other: heart cath    Discharge Planning  Discharge name/phone number:  - Malena  Overnight post sedation caregiver: [phone]  Discharge location: home    Tk Smith RN

## 2021-05-03 NOTE — Clinical Note
The first balloon was inserted into the right coronary artery.Max pressure = 12 vianca. Total duration = 18 seconds.

## 2021-05-03 NOTE — Clinical Note
The first balloon was inserted into the right coronary artery.Max pressure = 6 vianca. Total duration = 12 seconds.

## 2021-05-03 NOTE — Clinical Note
The first balloon was inserted into the right coronary artery.Max pressure = 12 vianca. Total duration = 20 seconds.     Max pressure = 12 vianca. Total duration = 12 seconds.    Balloon reinflated a second time: Max pressure = 12 vianca. Total duration = 12 seconds.

## 2021-05-03 NOTE — Clinical Note
Stent deployed in the right coronary artery. Max pressure = 12 vianac. Total duration = 16 seconds.

## 2021-05-03 NOTE — PLAN OF CARE
Care Suites Post Procedure Note    Patient Information  Name: Nighat Reyse  Age: 61 year old    Post Procedure  Time patient returned to Care Suites: 1510  Concerns/abnormal assessment: none  If abnormal assessment, provider notified: N/A  Plan/Other: recovery from sedation, text page to Dr. Sandoval to clarify length of bedrest (written for 3 hours, pt spouse stating he was told 2 hours)    Paulette Hassan RN

## 2021-05-03 NOTE — PROGRESS NOTES
Pt up - walked hallway > to restroom > + urination   Groin site remains dry and intact   Denies any CP - SOB

## 2021-05-04 ENCOUNTER — TELEPHONE (OUTPATIENT)
Dept: CARDIOLOGY | Facility: CLINIC | Age: 61
End: 2021-05-04

## 2021-05-04 NOTE — CONFIDENTIAL NOTE
Called patient post discharge from care suites after an angioplasty and stenting to the rPDA and mid RCA with 2 HASEEB . Access was obtained via the groin. No answer. LVM to call back.    ANGELA Nathan PA-C 9:10 AM 5/4/2021

## 2021-05-05 LAB
INTERPRETATION ECG - MUSE: NORMAL
INTERPRETATION ECG - MUSE: NORMAL

## 2021-05-13 ENCOUNTER — OFFICE VISIT (OUTPATIENT)
Dept: CARDIOLOGY | Facility: CLINIC | Age: 61
End: 2021-05-13
Payer: COMMERCIAL

## 2021-05-13 VITALS
SYSTOLIC BLOOD PRESSURE: 123 MMHG | BODY MASS INDEX: 28.91 KG/M2 | DIASTOLIC BLOOD PRESSURE: 82 MMHG | WEIGHT: 179.9 LBS | HEIGHT: 66 IN | HEART RATE: 76 BPM

## 2021-05-13 DIAGNOSIS — I25.10 CORONARY ARTERY DISEASE INVOLVING NATIVE CORONARY ARTERY OF NATIVE HEART WITHOUT ANGINA PECTORIS: Primary | ICD-10-CM

## 2021-05-13 DIAGNOSIS — E78.5 HYPERLIPIDEMIA LDL GOAL <70: ICD-10-CM

## 2021-05-13 DIAGNOSIS — I25.5 ISCHEMIC CARDIOMYOPATHY: ICD-10-CM

## 2021-05-13 DIAGNOSIS — I10 BENIGN ESSENTIAL HYPERTENSION: ICD-10-CM

## 2021-05-13 PROCEDURE — 99213 OFFICE O/P EST LOW 20 MIN: CPT | Performed by: PHYSICIAN ASSISTANT

## 2021-05-13 ASSESSMENT — MIFFLIN-ST. JEOR: SCORE: 1387.52

## 2021-05-13 NOTE — LETTER
5/13/2021    JEAN NAYAK  [address]  [place] [address]    RE: Nighat Reyes       Dear Colleague,    I had the pleasure of seeing Nighat Reyes in the [facility].    Primary Cardiologist: Dr. Sandoval    Reason for Visit: Post Angiogram Follow up    History of Present Illness:   Nighat is a very pleasant 61-year-old female with past medical history notable for history of admission in 4/2021 with anterior STEMI (status post PCI of mid LAD that had thrombotic occlusion; she had residual high-grade RCA disease which was intervened upon on 5/3/2021, receiving 1 HASEEB to RPDA and 1 HASEEB to mid RCA; on aspirin and Brilinta), hypertension, hyperlipidemia, and ischemic cardiomyopathy (cardiac MRI during her admission in 4/2021 showed LVEF 44%; she is on metoprolol succinate 50 mg daily and losartan 25 mg daily).    Nighat reports feeling much better.  She denies any discomfort at the femoral arteriotomy site.  She has no chest discomfort, shortness of breath, or bleeding issues.  She remains on aspirin and Brilinta.  She is tolerating all of her medications without any side effects.    Assessment and Plan:  Nighat is a very pleasant 61-year-old female with past medical history notable for history of admission in 4/2021 with anterior STEMI (status post PCI of mid LAD that had thrombotic occlusion; she had residual high-grade RCA disease which was intervened upon on 5/3/2021, receiving 1 HASEEB to RPDA and 1 HASEEB to mid RCA; on aspirin and Brilinta), hypertension, hyperlipidemia, and ischemic cardiomyopathy (cardiac MRI during her admission in 4/2021 showed LVEF 44%; she is on metoprolol succinate 50 mg daily and losartan 25 mg daily).    Nighat appears to be doing well from a cardiac standpoint.  We will continue with beta-blocker and ARB as well as dual antiplatelet therapy.  She is on high intensity statin therapy.  I will have her return in 2 months for repeat lipid  panel/ALT.  Her LDL goal is less than 70.  Depending on her lipid values at that time we may consider further increasing atorvastatin to 80 mg or switching to rosuvastatin.  We will also repeat echocardiogram in 6 months for reevaluation of her LVEF.      This note was completed in part using Dragon voice recognition software. Although reviewed after completion, some word and grammatical errors may occur.    Orders this Visit:  Orders Placed This Encounter   Procedures     Follow-Up with Cardiac Advanced Practice Provider     No orders of the defined types were placed in this encounter.    There are no discontinued medications.      Encounter Diagnoses   Name Primary?     Coronary artery disease involving native coronary artery of native heart without angina pectoris Yes     Hyperlipidemia LDL goal <70      Benign essential hypertension      Ischemic cardiomyopathy        CURRENT MEDICATIONS:  Current Outpatient Medications   Medication Sig Dispense Refill     aspirin (ASA) 81 MG EC tablet Take 1 tablet (81 mg) by mouth daily 90 tablet 3     atorvastatin (LIPITOR) 40 MG tablet Take 1 tablet (40 mg) by mouth every evening 90 tablet 3     insulin glargine (BASAGLAR KWIKPEN) 100 UNIT/ML pen Inject 18 Units Subcutaneous At Bedtime       losartan (COZAAR) 25 MG tablet Take 1 tablet (25 mg) by mouth daily 90 tablet 0     metFORMIN (GLUCOPHAGE) 1000 MG tablet Take 1,000 mg by mouth 2 times daily (with meals)       metoprolol succinate ER (TOPROL-XL) 50 MG 24 hr tablet Take 1 tablet (50 mg) by mouth daily 90 tablet 0     nitroGLYcerin (NITROSTAT) 0.4 MG sublingual tablet For chest pain place 1 tablet under the tongue every 5 minutes for 3 doses. If symptoms persist 5 minutes after 1st dose call 911. 30 tablet 0     sitagliptin (JANUVIA) 100 MG tablet Take 100 mg by mouth daily       ticagrelor (BRILINTA) 90 MG tablet Take 1 tablet (90 mg) by mouth every 12 hours 180 tablet 3       ALLERGIES     Allergies   Allergen  Reactions     Lisinopril        PAST MEDICAL HISTORY:  Past Medical History:   Diagnosis Date     Diabetes mellitus, type II (H) 04/14/2006    Uncontrolled     Essential hypertension 04/14/2006     Hyperlipidemia 04/14/2006     Ischemic cardiomyopathy      Patent ductus arteriosus     s/p surgical repair     ST elevation MI (STEMI) (H) 04/04/2021    s/p HASEEB to mid LAD     ST elevation myocardial infarction involving left anterior descending (LAD) coronary artery (H) 04/04/2021     Tobacco use        PAST SURGICAL HISTORY:  Past Surgical History:   Procedure Laterality Date     CV CORONARY ANGIOGRAM N/A 4/4/2021    Procedure: Coronary Angiogram;  Surgeon: Yusuf Maldonado MD;  Location: [facility] CARDIAC CATH LAB     CV HEART CATHETERIZATION WITH POSSIBLE INTERVENTION N/A 5/3/2021    Procedure: Heart Catheterization with Possible Intervention;  Surgeon: Alli Sandoval MD;  Location: [facility] CARDIAC CATH LAB     CV PCI STENT DRUG ELUTING N/A 4/4/2021    Procedure: Percutaneous Coronary Intervention Stent Drug Eluting;  Surgeon: Yusuf Maldonado MD;  Location: [facility] CARDIAC CATH LAB       FAMILY HISTORY:  Family History   Problem Relation Age of Onset     No Known Problems Mother      No Known Problems Father      No Known Problems Brother      No Known Problems Sister      Hypertension Son        SOCIAL HISTORY:  Social History     Socioeconomic History     Marital status:      Spouse name: None     Number of children: None     Years of education: None     Highest education level: None   Occupational History     None   Social Needs     Financial resource strain: None     Food insecurity     Worry: None     Inability: None     Transportation needs     Medical: None     Non-medical: None   Tobacco Use     Smoking status: Never Smoker     Smokeless tobacco: Never Used   Substance and Sexual Activity     Alcohol use: Yes     Comment: 3 drinks per week     Drug use: None     Sexual activity: None  "  Lifestyle     Physical activity     Days per week: None     Minutes per session: None     Stress: None   Relationships     Social connections     Talks on phone: None     Gets together: None     Attends Evangelical service: None     Active member of club or organization: None     Attends meetings of clubs or organizations: None     Relationship status: None     Intimate partner violence     Fear of current or ex partner: None     Emotionally abused: None     Physically abused: None     Forced sexual activity: None   Other Topics Concern     Parent/sibling w/ CABG, MI or angioplasty before 65F 55M? Not Asked   Social History Narrative     None       Review of Systems:  Skin:  Negative     Eyes:  Positive for contacts  ENT:  Negative    Respiratory:  Positive for cough  Cardiovascular:  Negative    Gastroenterology: Negative    Genitourinary:  Negative    Musculoskeletal:  Negative    Neurologic:  Positive for headaches  Psychiatric:  Negative    Heme/Lymph/Imm:    allergies  Endocrine:    diabetes    Physical Exam:  Vitals: /82   Pulse 76   Ht 1.676 m (5' 5.98\")   Wt 81.6 kg (179 lb 14.4 oz)   BMI 29.05 kg/m       GEN:  NAD  NECK: No JVD  C/V:  Regular rate and rhythm, no murmur, rub or gallop.  RESP: Clear to auscultation bilaterally without wheezing, rales, or rhonchi.  GI: Abdomen soft, nontender, nondistended.   EXTREM: No LE edema.   NEURO: Alert and oriented, cooperative. No obvious focal deficits.   PSYCH: Normal affect.  SKIN: Warm and dry.       Recent Lab Results:  LIPID RESULTS:  Lab Results   Component Value Date    CHOL 224 (H) 04/04/2021    HDL 37 (L) 04/04/2021     (H) 04/04/2021    TRIG 74 04/04/2021       LIVER ENZYME RESULTS:  Lab Results   Component Value Date    AST 44 04/07/2021    ALT 29 04/07/2021       CBC RESULTS:  Lab Results   Component Value Date    WBC 5.5 05/03/2021    RBC 4.05 05/03/2021    HGB 12.2 05/03/2021    HCT 37.8 05/03/2021    MCV 93 05/03/2021    MCH 30.1 " 05/03/2021    MCHC 32.3 05/03/2021    RDW 13.8 05/03/2021     05/03/2021       BMP RESULTS:  Lab Results   Component Value Date     05/03/2021    POTASSIUM 4.1 05/03/2021    CHLORIDE 109 05/03/2021    CO2 26 05/03/2021    ANIONGAP 5 05/03/2021     (H) 05/03/2021    BUN 13 05/03/2021    CR 0.73 05/03/2021    GFRESTIMATED 87 05/03/2021    GFRESTBLACK >90 05/03/2021    LEYLA 9.5 05/03/2021        A1C RESULTS:  Lab Results   Component Value Date    A1C 10.9 (H) 04/04/2021       INR RESULTS:  Lab Results   Component Value Date    INR 0.98 05/03/2021    INR 1.19 (H) 04/07/2021     Tiago Brewster PA-C  May 13, 2021     cc:   No referring provider defined for this encounter.

## 2021-05-13 NOTE — PROGRESS NOTES
Primary Cardiologist: Dr. Sandoval    Reason for Visit: Post Angiogram Follow up    History of Present Illness:   Nighat is a very pleasant 61-year-old female with past medical history notable for history of admission in 4/2021 with anterior STEMI (status post PCI of mid LAD that had thrombotic occlusion; she had residual high-grade RCA disease which was intervened upon on 5/3/2021, receiving 1 HASEEB to RPDA and 1 HASEEB to mid RCA; on aspirin and Brilinta), hypertension, hyperlipidemia, and ischemic cardiomyopathy (cardiac MRI during her admission in 4/2021 showed LVEF 44%; she is on metoprolol succinate 50 mg daily and losartan 25 mg daily).    Nighat reports feeling much better.  She denies any discomfort at the femoral arteriotomy site.  She has no chest discomfort, shortness of breath, or bleeding issues.  She remains on aspirin and Brilinta.  She is tolerating all of her medications without any side effects.    Assessment and Plan:  Nighat is a very pleasant 61-year-old female with past medical history notable for history of admission in 4/2021 with anterior STEMI (status post PCI of mid LAD that had thrombotic occlusion; she had residual high-grade RCA disease which was intervened upon on 5/3/2021, receiving 1 HASEEB to RPDA and 1 HASEEB to mid RCA; on aspirin and Brilinta), hypertension, hyperlipidemia, and ischemic cardiomyopathy (cardiac MRI during her admission in 4/2021 showed LVEF 44%; she is on metoprolol succinate 50 mg daily and losartan 25 mg daily).    Nighat appears to be doing well from a cardiac standpoint.  We will continue with beta-blocker and ARB as well as dual antiplatelet therapy.  She is on high intensity statin therapy.  I will have her return in 2 months for repeat lipid panel/ALT.  Her LDL goal is less than 70.  Depending on her lipid values at that time we may consider further increasing atorvastatin to 80 mg or switching to rosuvastatin.  We will also repeat echocardiogram in 6 months for reevaluation of  her LVEF.      This note was completed in part using Dragon voice recognition software. Although reviewed after completion, some word and grammatical errors may occur.    Orders this Visit:  Orders Placed This Encounter   Procedures     Follow-Up with Cardiac Advanced Practice Provider     No orders of the defined types were placed in this encounter.    There are no discontinued medications.      Encounter Diagnoses   Name Primary?     Coronary artery disease involving native coronary artery of native heart without angina pectoris Yes     Hyperlipidemia LDL goal <70      Benign essential hypertension      Ischemic cardiomyopathy        CURRENT MEDICATIONS:  Current Outpatient Medications   Medication Sig Dispense Refill     aspirin (ASA) 81 MG EC tablet Take 1 tablet (81 mg) by mouth daily 90 tablet 3     atorvastatin (LIPITOR) 40 MG tablet Take 1 tablet (40 mg) by mouth every evening 90 tablet 3     insulin glargine (BASAGLAR KWIKPEN) 100 UNIT/ML pen Inject 18 Units Subcutaneous At Bedtime       losartan (COZAAR) 25 MG tablet Take 1 tablet (25 mg) by mouth daily 90 tablet 0     metFORMIN (GLUCOPHAGE) 1000 MG tablet Take 1,000 mg by mouth 2 times daily (with meals)       metoprolol succinate ER (TOPROL-XL) 50 MG 24 hr tablet Take 1 tablet (50 mg) by mouth daily 90 tablet 0     nitroGLYcerin (NITROSTAT) 0.4 MG sublingual tablet For chest pain place 1 tablet under the tongue every 5 minutes for 3 doses. If symptoms persist 5 minutes after 1st dose call 911. 30 tablet 0     sitagliptin (JANUVIA) 100 MG tablet Take 100 mg by mouth daily       ticagrelor (BRILINTA) 90 MG tablet Take 1 tablet (90 mg) by mouth every 12 hours 180 tablet 3       ALLERGIES     Allergies   Allergen Reactions     Lisinopril        PAST MEDICAL HISTORY:  Past Medical History:   Diagnosis Date     Diabetes mellitus, type II (H) 04/14/2006    Uncontrolled     Essential hypertension 04/14/2006     Hyperlipidemia 04/14/2006     Ischemic  cardiomyopathy      Patent ductus arteriosus     s/p surgical repair     ST elevation MI (STEMI) (H) 04/04/2021    s/p HASEEB to mid LAD     ST elevation myocardial infarction involving left anterior descending (LAD) coronary artery (H) 04/04/2021     Tobacco use        PAST SURGICAL HISTORY:  Past Surgical History:   Procedure Laterality Date     CV CORONARY ANGIOGRAM N/A 4/4/2021    Procedure: Coronary Angiogram;  Surgeon: Yusuf Maldonado MD;  Location: [facility] CARDIAC CATH LAB     CV HEART CATHETERIZATION WITH POSSIBLE INTERVENTION N/A 5/3/2021    Procedure: Heart Catheterization with Possible Intervention;  Surgeon: Alli Sandoval MD;  Location: [facility] CARDIAC CATH LAB     CV PCI STENT DRUG ELUTING N/A 4/4/2021    Procedure: Percutaneous Coronary Intervention Stent Drug Eluting;  Surgeon: Yusuf Maldonado MD;  Location: [facility] CARDIAC CATH LAB       FAMILY HISTORY:  Family History   Problem Relation Age of Onset     No Known Problems Mother      No Known Problems Father      No Known Problems Brother      No Known Problems Sister      Hypertension Son        SOCIAL HISTORY:  Social History     Socioeconomic History     Marital status:      Spouse name: None     Number of children: None     Years of education: None     Highest education level: None   Occupational History     None   Social Needs     Financial resource strain: None     Food insecurity     Worry: None     Inability: None     Transportation needs     Medical: None     Non-medical: None   Tobacco Use     Smoking status: Never Smoker     Smokeless tobacco: Never Used   Substance and Sexual Activity     Alcohol use: Yes     Comment: 3 drinks per week     Drug use: None     Sexual activity: None   Lifestyle     Physical activity     Days per week: None     Minutes per session: None     Stress: None   Relationships     Social connections     Talks on phone: None     Gets together: None     Attends Buddhism service: None      "Active member of club or organization: None     Attends meetings of clubs or organizations: None     Relationship status: None     Intimate partner violence     Fear of current or ex partner: None     Emotionally abused: None     Physically abused: None     Forced sexual activity: None   Other Topics Concern     Parent/sibling w/ CABG, MI or angioplasty before 65F 55M? Not Asked   Social History Narrative     None       Review of Systems:  Skin:  Negative     Eyes:  Positive for contacts  ENT:  Negative    Respiratory:  Positive for cough  Cardiovascular:  Negative    Gastroenterology: Negative    Genitourinary:  Negative    Musculoskeletal:  Negative    Neurologic:  Positive for headaches  Psychiatric:  Negative    Heme/Lymph/Imm:    allergies  Endocrine:    diabetes    Physical Exam:  Vitals: /82   Pulse 76   Ht 1.676 m (5' 5.98\")   Wt 81.6 kg (179 lb 14.4 oz)   BMI 29.05 kg/m       GEN:  NAD  NECK: No JVD  C/V:  Regular rate and rhythm, no murmur, rub or gallop.  RESP: Clear to auscultation bilaterally without wheezing, rales, or rhonchi.  GI: Abdomen soft, nontender, nondistended.   EXTREM: No LE edema.   NEURO: Alert and oriented, cooperative. No obvious focal deficits.   PSYCH: Normal affect.  SKIN: Warm and dry.       Recent Lab Results:  LIPID RESULTS:  Lab Results   Component Value Date    CHOL 224 (H) 04/04/2021    HDL 37 (L) 04/04/2021     (H) 04/04/2021    TRIG 74 04/04/2021       LIVER ENZYME RESULTS:  Lab Results   Component Value Date    AST 44 04/07/2021    ALT 29 04/07/2021       CBC RESULTS:  Lab Results   Component Value Date    WBC 5.5 05/03/2021    RBC 4.05 05/03/2021    HGB 12.2 05/03/2021    HCT 37.8 05/03/2021    MCV 93 05/03/2021    MCH 30.1 05/03/2021    MCHC 32.3 05/03/2021    RDW 13.8 05/03/2021     05/03/2021       BMP RESULTS:  Lab Results   Component Value Date     05/03/2021    POTASSIUM 4.1 05/03/2021    CHLORIDE 109 05/03/2021    CO2 26 05/03/2021    " ANIONGAP 5 05/03/2021     (H) 05/03/2021    BUN 13 05/03/2021    CR 0.73 05/03/2021    GFRESTIMATED 87 05/03/2021    GFRESTBLACK >90 05/03/2021    LEYLA 9.5 05/03/2021        A1C RESULTS:  Lab Results   Component Value Date    A1C 10.9 (H) 04/04/2021       INR RESULTS:  Lab Results   Component Value Date    INR 0.98 05/03/2021    INR 1.19 (H) 04/07/2021           Tiago Brewster PA-C  May 13, 2021

## 2021-05-13 NOTE — PATIENT INSTRUCTIONS
Today's Plan:   1) Continue with current medications.   2) Follow up with us in July with fasting blood work beforehand.     If you have questions or concerns please call my nurse team at [phone].     Scheduling phone number: [phone]  Reminder: Please bring in all current medications, over the counter supplements and vitamin bottles to your next appointment.    It was a pleasure seeing you today!     Tiago Brewster PA-C  5/13/2021

## 2021-07-02 ENCOUNTER — PRE VISIT (OUTPATIENT)
Dept: CARDIOLOGY | Facility: CLINIC | Age: 61
End: 2021-07-02

## 2021-07-02 NOTE — TELEPHONE ENCOUNTER
Left message for patient to remind her to set up a lab visit at [facility]. Asked patient to call if she needs an order set faxed to her lab clinic team.    7/9/2021 left a message for patient that order was sent for flp/alt to [facility]. Reminded patient to call there for an appointment before her visit on Tuesday.    7/12/2021 received labwork from [facility]. Entered and sent to scan

## 2021-07-12 ENCOUNTER — TRANSFERRED RECORDS (OUTPATIENT)
Dept: HEALTH INFORMATION MANAGEMENT | Facility: CLINIC | Age: 61
End: 2021-07-12

## 2021-07-12 LAB
BUN SERPL-MCNC: 9 MG/DL
CALCIUM SERPL-MCNC: 9.5 MG/DL
CHLORIDE SERPLBLD-SCNC: 102 MMOL/L
CHOLEST SERPL-MCNC: 120 MG/DL
CHOLEST/HDLC SERPL: NORMAL {RATIO}
CO2 SERPL-SCNC: 22 MMOL/L
CREAT SERPL-MCNC: 0.79 MG/DL
GFR SERPL CREATININE-BSD FRML MDRD: >60 ML/MIN/1.7
GLUCOSE SERPL-MCNC: 133 MG/DL (ref 65–100)
HDLC SERPL-MCNC: 43 MG/DL
LDLC SERPL CALC-MCNC: 59 MG/DL
POTASSIUM SERPL-SCNC: 4.6 MMOL/L
SODIUM SERPL-SCNC: 133 MMOL/L
TRIGL SERPL-MCNC: 91 MG/DL

## 2021-07-13 ENCOUNTER — OFFICE VISIT (OUTPATIENT)
Dept: CARDIOLOGY | Facility: CLINIC | Age: 61
End: 2021-07-13
Attending: PHYSICIAN ASSISTANT
Payer: COMMERCIAL

## 2021-07-13 VITALS
HEIGHT: 66 IN | DIASTOLIC BLOOD PRESSURE: 82 MMHG | WEIGHT: 175 LBS | SYSTOLIC BLOOD PRESSURE: 122 MMHG | BODY MASS INDEX: 28.12 KG/M2 | HEART RATE: 76 BPM

## 2021-07-13 DIAGNOSIS — I10 BENIGN ESSENTIAL HYPERTENSION: ICD-10-CM

## 2021-07-13 DIAGNOSIS — E78.5 HYPERLIPIDEMIA LDL GOAL <70: ICD-10-CM

## 2021-07-13 DIAGNOSIS — I25.5 ISCHEMIC CARDIOMYOPATHY: ICD-10-CM

## 2021-07-13 DIAGNOSIS — I25.10 CORONARY ARTERY DISEASE INVOLVING NATIVE CORONARY ARTERY OF NATIVE HEART WITHOUT ANGINA PECTORIS: ICD-10-CM

## 2021-07-13 PROCEDURE — 99214 OFFICE O/P EST MOD 30 MIN: CPT | Performed by: PHYSICIAN ASSISTANT

## 2021-07-13 ASSESSMENT — MIFFLIN-ST. JEOR: SCORE: 1365.54

## 2021-07-13 NOTE — LETTER
7/13/2021    JEAN NAYKA  [address]  [place] [address]    RE: Nighat Reyes       Dear Colleague,    I had the pleasure of seeing Nighathayley Reyes in the [facility].    Primary Cardiologist: Dr. Sandoval    Reason for Visit: Follow up to review lipid panel    History of Present Illness:   Nighat is a very pleasant 61-year-old female with past medical history notable for history of admission in 4/2021 with anterior STEMI (status post PCI of mid LAD that had thrombotic occlusion; she had residual high-grade RCA disease which was intervened upon on 5/3/2021, receiving 1 HASEEB to RPDA and 1 HASEEB to mid RCA; on aspirin and Brilinta), hypertension, hyperlipidemia, and ischemic cardiomyopathy (cardiac MRI during her admission in 4/2021 showed LVEF 44%; she is on metoprolol succinate 50 mg daily and losartan 25 mg daily).    During last office visit her lipid panel showed elevated numbers.  We decided to have her follow-up in 1 month for reevaluation of this.  She reports feeling well.  She denies palpitations, shortness of breath, peripheral edema, orthopnea, or PND.  She does describe mild epigastric discomfort that she describes as burning.  It is not exertional.  It feels very different than her angina back in 4/2021.  She tends to take her medications with no food.  She describes her burning sensation occurs after large meals.  She has no associated nausea or diaphoresis.    Assessment and Plan:  Nighat is a very pleasant 61-year-old female with past medical history notable for history of admission in 4/2021 with anterior STEMI (status post PCI of mid LAD that had thrombotic occlusion; she had residual high-grade RCA disease which was intervened upon on 5/3/2021, receiving 1 HASEEB to RPDA and 1 HASEEB to mid RCA; on aspirin and Brilinta), hypertension, hyperlipidemia, and ischemic cardiomyopathy (cardiac MRI during her admission in 4/2021 showed LVEF 44%; she is on  metoprolol succinate 50 mg daily and losartan 25 mg daily).    She appears to have symptoms of GERD.  It happens after large meals and is not exertional.  She is on Brilinta and aspirin.  I strongly recommended that she takes her medications with food.  I also sent her prescription to take omeprazole daily.  If her epigastric burning pain gets worse she will contact us or her PCP clinic.  Her lipid panel shows excellent control of her numbers.  LDL is down to 59.  We will continue with atorvastatin 40 mg daily.  I will see her back in about 4 months with repeat echocardiogram.      This note was completed in part using Dragon voice recognition software. Although reviewed after completion, some word and grammatical errors may occur.    Orders this Visit:  Orders Placed This Encounter   Procedures     Follow-Up with Cardiac Advanced Practice Provider     Echocardiogram Limited     Orders Placed This Encounter   Medications     omeprazole (PRILOSEC) 20 MG DR capsule     Sig: Take 1 capsule (20 mg) by mouth daily     Dispense:  90 capsule     Refill:  3     There are no discontinued medications.      Encounter Diagnoses   Name Primary?     Coronary artery disease involving native coronary artery of native heart without angina pectoris      Hyperlipidemia LDL goal <70      Benign essential hypertension      Ischemic cardiomyopathy        CURRENT MEDICATIONS:  Current Outpatient Medications   Medication Sig Dispense Refill     aspirin (ASA) 81 MG EC tablet Take 1 tablet (81 mg) by mouth daily 90 tablet 3     atorvastatin (LIPITOR) 40 MG tablet Take 1 tablet (40 mg) by mouth every evening 90 tablet 3     insulin glargine (BASAGLAR KWIKPEN) 100 UNIT/ML pen Inject 18 Units Subcutaneous At Bedtime       losartan (COZAAR) 25 MG tablet Take 1 tablet (25 mg) by mouth daily 90 tablet 0     metFORMIN (GLUCOPHAGE) 1000 MG tablet Take 1,000 mg by mouth 2 times daily (with meals)       metoprolol succinate ER (TOPROL-XL) 50 MG 24 hr  tablet Take 1 tablet (50 mg) by mouth daily 90 tablet 0     omeprazole (PRILOSEC) 20 MG DR capsule Take 1 capsule (20 mg) by mouth daily 90 capsule 3     sitagliptin (JANUVIA) 100 MG tablet Take 100 mg by mouth daily       ticagrelor (BRILINTA) 90 MG tablet Take 1 tablet (90 mg) by mouth every 12 hours 180 tablet 3     nitroGLYcerin (NITROSTAT) 0.4 MG sublingual tablet For chest pain place 1 tablet under the tongue every 5 minutes for 3 doses. If symptoms persist 5 minutes after 1st dose call 911. (Patient not taking: Reported on 7/13/2021) 30 tablet 0       ALLERGIES     Allergies   Allergen Reactions     Lisinopril        PAST MEDICAL HISTORY:  Past Medical History:   Diagnosis Date     Diabetes mellitus, type II (H) 04/14/2006    Uncontrolled     Essential hypertension 04/14/2006     Hyperlipidemia 04/14/2006     Ischemic cardiomyopathy      Patent ductus arteriosus     s/p surgical repair     ST elevation MI (STEMI) (H) 04/04/2021    s/p HASEEB to mid LAD     ST elevation myocardial infarction involving left anterior descending (LAD) coronary artery (H) 04/04/2021     Tobacco use        PAST SURGICAL HISTORY:  Past Surgical History:   Procedure Laterality Date     CV CORONARY ANGIOGRAM N/A 4/4/2021    Procedure: Coronary Angiogram;  Surgeon: Yusuf Maldonado MD;  Location: [facility] CARDIAC CATH LAB     CV HEART CATHETERIZATION WITH POSSIBLE INTERVENTION N/A 5/3/2021    Procedure: Heart Catheterization with Possible Intervention;  Surgeon: Alli Sandoval MD;  Location: [facility] CARDIAC CATH LAB     CV PCI STENT DRUG ELUTING N/A 4/4/2021    Procedure: Percutaneous Coronary Intervention Stent Drug Eluting;  Surgeon: Yusuf Maldonado MD;  Location: [facility] CARDIAC CATH LAB       FAMILY HISTORY:  Family History   Problem Relation Age of Onset     No Known Problems Mother      No Known Problems Father      No Known Problems Brother      No Known Problems Sister      Hypertension Son        SOCIAL  "HISTORY:  Social History     Socioeconomic History     Marital status:      Spouse name: None     Number of children: None     Years of education: None     Highest education level: None   Occupational History     None   Tobacco Use     Smoking status: Never Smoker     Smokeless tobacco: Never Used   Substance and Sexual Activity     Alcohol use: Yes     Comment: limited     Drug use: None     Sexual activity: None   Other Topics Concern     Parent/sibling w/ CABG, MI or angioplasty before 65F 55M? Not Asked   Social History Narrative     None     Social Determinants of Health     Financial Resource Strain:      Difficulty of Paying Living Expenses:    Food Insecurity:      Worried About Running Out of Food in the Last Year:      Ran Out of Food in the Last Year:    Transportation Needs:      Lack of Transportation (Medical):      Lack of Transportation (Non-Medical):    Physical Activity:      Days of Exercise per Week:      Minutes of Exercise per Session:    Stress:      Feeling of Stress :    Social Connections:      Frequency of Communication with Friends and Family:      Frequency of Social Gatherings with Friends and Family:      Attends [employer] Services:      Active Member of Clubs or Organizations:      Attends Club or Organization Meetings:      Marital Status:    Intimate Partner Violence:      Fear of Current or Ex-Partner:      Emotionally Abused:      Physically Abused:      Sexually Abused:        Review of Systems:  Skin:  Negative     Eyes:  Negative    ENT:  Negative    Respiratory:  Negative    Cardiovascular:    edema;Positive for  Gastroenterology: Positive for reflux  Genitourinary:  Negative    Musculoskeletal:  Negative    Neurologic:  Negative    Psychiatric:  Negative    Heme/Lymph/Imm:  Negative    Endocrine:  Positive for diabetes    Physical Exam:  Vitals: /82   Pulse 76   Ht 1.676 m (5' 6\")   Wt 79.4 kg (175 lb)   BMI 28.25 kg/m       GEN:  NAD  NECK: No JVD  C/V:  " Regular rate and rhythm, no murmur, rub or gallop.  RESP: Clear to auscultation bilaterally without wheezing, rales, or rhonchi.  GI: Abdomen soft, nontender, nondistended.   EXTREM: No LE edema.   NEURO: Alert and oriented, cooperative. No obvious focal deficits.   PSYCH: Normal affect.  SKIN: Warm and dry.       Recent Lab Results:  LIPID RESULTS:  Lab Results   Component Value Date    CHOL 120 07/12/2021    HDL 43 07/12/2021    LDL 59 07/12/2021     (H) 04/04/2021    TRIG 91 07/12/2021       LIVER ENZYME RESULTS:  Lab Results   Component Value Date    AST 44 04/07/2021    ALT 29 04/07/2021       CBC RESULTS:  Lab Results   Component Value Date    WBC 5.5 05/03/2021    RBC 4.05 05/03/2021    HGB 12.2 05/03/2021    HCT 37.8 05/03/2021    MCV 93 05/03/2021    MCH 30.1 05/03/2021    MCHC 32.3 05/03/2021    RDW 13.8 05/03/2021     05/03/2021       BMP RESULTS:  Lab Results   Component Value Date     07/12/2021    POTASSIUM 4.6 07/12/2021    CHLORIDE 102 07/12/2021    CO2 22 07/12/2021    ANIONGAP 5 05/03/2021     (A) 07/12/2021    BUN 9 07/12/2021    CR 0.79 07/12/2021    GFRESTIMATED 87 05/03/2021    GFRESTBLACK >90 05/03/2021    LEYLA 9.5 07/12/2021        A1C RESULTS:  Lab Results   Component Value Date    A1C 10.9 (H) 04/04/2021       INR RESULTS:  Lab Results   Component Value Date    INR 0.98 05/03/2021    INR 1.19 (H) 04/07/2021           Tiago Brewster PA-C  July 13, 2021         Thank you for allowing me to participate in the care of your patient.      Sincerely,     Tiago Brewster PA-C     [facility]  cc:   Tiago Brewster PA-C  [address] [address]

## 2021-07-13 NOTE — PATIENT INSTRUCTIONS
Today's Plan:   1) Start taking prilosec- one pill once a day. This should help protect stomach lining.   2) Come back in 4 months with echo (heart ultrasound) to check your heart function.   3) Your cholesterol numbers are much better so continue with atorvastatin.   4) If burning sensation is getting worse, call us.     If you have questions or concerns please call my nurse team at [phone].     Scheduling phone number: [phone]  Reminder: Please bring in all current medications, over the counter supplements and vitamin bottles to your next appointment.    It was a pleasure seeing you today!     Tiago Brewster PA-C  7/13/2021

## 2021-07-13 NOTE — PROGRESS NOTES
Primary Cardiologist: Dr. Sandoval    Reason for Visit: Follow up to review lipid panel    History of Present Illness:   Nighat is a very pleasant 61-year-old female with past medical history notable for history of admission in 4/2021 with anterior STEMI (status post PCI of mid LAD that had thrombotic occlusion; she had residual high-grade RCA disease which was intervened upon on 5/3/2021, receiving 1 HASEEB to RPDA and 1 HASEEB to mid RCA; on aspirin and Brilinta), hypertension, hyperlipidemia, and ischemic cardiomyopathy (cardiac MRI during her admission in 4/2021 showed LVEF 44%; she is on metoprolol succinate 50 mg daily and losartan 25 mg daily).    During last office visit her lipid panel showed elevated numbers.  We decided to have her follow-up in 1 month for reevaluation of this.  She reports feeling well.  She denies palpitations, shortness of breath, peripheral edema, orthopnea, or PND.  She does describe mild epigastric discomfort that she describes as burning.  It is not exertional.  It feels very different than her angina back in 4/2021.  She tends to take her medications with no food.  She describes her burning sensation occurs after large meals.  She has no associated nausea or diaphoresis.    Assessment and Plan:  Nighat is a very pleasant 61-year-old female with past medical history notable for history of admission in 4/2021 with anterior STEMI (status post PCI of mid LAD that had thrombotic occlusion; she had residual high-grade RCA disease which was intervened upon on 5/3/2021, receiving 1 HASEEB to RPDA and 1 HASEEB to mid RCA; on aspirin and Brilinta), hypertension, hyperlipidemia, and ischemic cardiomyopathy (cardiac MRI during her admission in 4/2021 showed LVEF 44%; she is on metoprolol succinate 50 mg daily and losartan 25 mg daily).    She appears to have symptoms of GERD.  It happens after large meals and is not exertional.  She is on Brilinta and aspirin.  I strongly recommended that she takes her  medications with food.  I also sent her prescription to take omeprazole daily.  If her epigastric burning pain gets worse she will contact us or her PCP clinic.  Her lipid panel shows excellent control of her numbers.  LDL is down to 59.  We will continue with atorvastatin 40 mg daily.  I will see her back in about 4 months with repeat echocardiogram.      This note was completed in part using Dragon voice recognition software. Although reviewed after completion, some word and grammatical errors may occur.    Orders this Visit:  Orders Placed This Encounter   Procedures     Follow-Up with Cardiac Advanced Practice Provider     Echocardiogram Limited     Orders Placed This Encounter   Medications     omeprazole (PRILOSEC) 20 MG DR capsule     Sig: Take 1 capsule (20 mg) by mouth daily     Dispense:  90 capsule     Refill:  3     There are no discontinued medications.      Encounter Diagnoses   Name Primary?     Coronary artery disease involving native coronary artery of native heart without angina pectoris      Hyperlipidemia LDL goal <70      Benign essential hypertension      Ischemic cardiomyopathy        CURRENT MEDICATIONS:  Current Outpatient Medications   Medication Sig Dispense Refill     aspirin (ASA) 81 MG EC tablet Take 1 tablet (81 mg) by mouth daily 90 tablet 3     atorvastatin (LIPITOR) 40 MG tablet Take 1 tablet (40 mg) by mouth every evening 90 tablet 3     insulin glargine (BASAGLAR KWIKPEN) 100 UNIT/ML pen Inject 18 Units Subcutaneous At Bedtime       losartan (COZAAR) 25 MG tablet Take 1 tablet (25 mg) by mouth daily 90 tablet 0     metFORMIN (GLUCOPHAGE) 1000 MG tablet Take 1,000 mg by mouth 2 times daily (with meals)       metoprolol succinate ER (TOPROL-XL) 50 MG 24 hr tablet Take 1 tablet (50 mg) by mouth daily 90 tablet 0     omeprazole (PRILOSEC) 20 MG DR capsule Take 1 capsule (20 mg) by mouth daily 90 capsule 3     sitagliptin (JANUVIA) 100 MG tablet Take 100 mg by mouth daily        ticagrelor (BRILINTA) 90 MG tablet Take 1 tablet (90 mg) by mouth every 12 hours 180 tablet 3     nitroGLYcerin (NITROSTAT) 0.4 MG sublingual tablet For chest pain place 1 tablet under the tongue every 5 minutes for 3 doses. If symptoms persist 5 minutes after 1st dose call 911. (Patient not taking: Reported on 7/13/2021) 30 tablet 0       ALLERGIES     Allergies   Allergen Reactions     Lisinopril        PAST MEDICAL HISTORY:  Past Medical History:   Diagnosis Date     Diabetes mellitus, type II (H) 04/14/2006    Uncontrolled     Essential hypertension 04/14/2006     Hyperlipidemia 04/14/2006     Ischemic cardiomyopathy      Patent ductus arteriosus     s/p surgical repair     ST elevation MI (STEMI) (H) 04/04/2021    s/p HASEEB to mid LAD     ST elevation myocardial infarction involving left anterior descending (LAD) coronary artery (H) 04/04/2021     Tobacco use        PAST SURGICAL HISTORY:  Past Surgical History:   Procedure Laterality Date     CV CORONARY ANGIOGRAM N/A 4/4/2021    Procedure: Coronary Angiogram;  Surgeon: Yusuf Maldonado MD;  Location: [facility] CARDIAC CATH LAB     CV HEART CATHETERIZATION WITH POSSIBLE INTERVENTION N/A 5/3/2021    Procedure: Heart Catheterization with Possible Intervention;  Surgeon: Alli Sandoval MD;  Location: [facility] CARDIAC CATH LAB     CV PCI STENT DRUG ELUTING N/A 4/4/2021    Procedure: Percutaneous Coronary Intervention Stent Drug Eluting;  Surgeon: Yusuf Maldonado MD;  Location: [facility] CARDIAC CATH LAB       FAMILY HISTORY:  Family History   Problem Relation Age of Onset     No Known Problems Mother      No Known Problems Father      No Known Problems Brother      No Known Problems Sister      Hypertension Son        SOCIAL HISTORY:  Social History     Socioeconomic History     Marital status:      Spouse name: None     Number of children: None     Years of education: None     Highest education level: None   Occupational History     None  "  Tobacco Use     Smoking status: Never Smoker     Smokeless tobacco: Never Used   Substance and Sexual Activity     Alcohol use: Yes     Comment: limited     Drug use: None     Sexual activity: None   Other Topics Concern     Parent/sibling w/ CABG, MI or angioplasty before 65F 55M? Not Asked   Social History Narrative     None     Social Determinants of Health     Financial Resource Strain:      Difficulty of Paying Living Expenses:    Food Insecurity:      Worried About Running Out of Food in the Last Year:      Ran Out of Food in the Last Year:    Transportation Needs:      Lack of Transportation (Medical):      Lack of Transportation (Non-Medical):    Physical Activity:      Days of Exercise per Week:      Minutes of Exercise per Session:    Stress:      Feeling of Stress :    Social Connections:      Frequency of Communication with Friends and Family:      Frequency of Social Gatherings with Friends and Family:      Attends Tenri[employer] Services:      Active Member of Clubs or Organizations:      Attends Club or Organization Meetings:      Marital Status:    Intimate Partner Violence:      Fear of Current or Ex-Partner:      Emotionally Abused:      Physically Abused:      Sexually Abused:        Review of Systems:  Skin:  Negative     Eyes:  Negative    ENT:  Negative    Respiratory:  Negative    Cardiovascular:    edema;Positive for  Gastroenterology: Positive for reflux  Genitourinary:  Negative    Musculoskeletal:  Negative    Neurologic:  Negative    Psychiatric:  Negative    Heme/Lymph/Imm:  Negative    Endocrine:  Positive for diabetes    Physical Exam:  Vitals: /82   Pulse 76   Ht 1.676 m (5' 6\")   Wt 79.4 kg (175 lb)   BMI 28.25 kg/m       GEN:  NAD  NECK: No JVD  C/V:  Regular rate and rhythm, no murmur, rub or gallop.  RESP: Clear to auscultation bilaterally without wheezing, rales, or rhonchi.  GI: Abdomen soft, nontender, nondistended.   EXTREM: No LE edema.   NEURO: Alert and oriented, " cooperative. No obvious focal deficits.   PSYCH: Normal affect.  SKIN: Warm and dry.       Recent Lab Results:  LIPID RESULTS:  Lab Results   Component Value Date    CHOL 120 07/12/2021    HDL 43 07/12/2021    LDL 59 07/12/2021     (H) 04/04/2021    TRIG 91 07/12/2021       LIVER ENZYME RESULTS:  Lab Results   Component Value Date    AST 44 04/07/2021    ALT 29 04/07/2021       CBC RESULTS:  Lab Results   Component Value Date    WBC 5.5 05/03/2021    RBC 4.05 05/03/2021    HGB 12.2 05/03/2021    HCT 37.8 05/03/2021    MCV 93 05/03/2021    MCH 30.1 05/03/2021    MCHC 32.3 05/03/2021    RDW 13.8 05/03/2021     05/03/2021       BMP RESULTS:  Lab Results   Component Value Date     07/12/2021    POTASSIUM 4.6 07/12/2021    CHLORIDE 102 07/12/2021    CO2 22 07/12/2021    ANIONGAP 5 05/03/2021     (A) 07/12/2021    BUN 9 07/12/2021    CR 0.79 07/12/2021    GFRESTIMATED 87 05/03/2021    GFRESTBLACK >90 05/03/2021    LEYLA 9.5 07/12/2021        A1C RESULTS:  Lab Results   Component Value Date    A1C 10.9 (H) 04/04/2021       INR RESULTS:  Lab Results   Component Value Date    INR 0.98 05/03/2021    INR 1.19 (H) 04/07/2021           Tiago Brewster PA-C  July 13, 2021

## 2021-11-10 ENCOUNTER — HOSPITAL ENCOUNTER (OUTPATIENT)
Dept: CARDIOLOGY | Facility: CLINIC | Age: 61
Discharge: HOME | End: 2021-11-10
Attending: PHYSICIAN ASSISTANT | Admitting: PHYSICIAN ASSISTANT
Payer: COMMERCIAL

## 2021-11-10 DIAGNOSIS — I10 BENIGN ESSENTIAL HYPERTENSION: ICD-10-CM

## 2021-11-10 DIAGNOSIS — E78.5 HYPERLIPIDEMIA LDL GOAL <70: ICD-10-CM

## 2021-11-10 DIAGNOSIS — I25.10 CORONARY ARTERY DISEASE INVOLVING NATIVE CORONARY ARTERY OF NATIVE HEART WITHOUT ANGINA PECTORIS: ICD-10-CM

## 2021-11-10 DIAGNOSIS — I25.5 ISCHEMIC CARDIOMYOPATHY: ICD-10-CM

## 2021-11-10 LAB
BI-PLANE LVEF ECHO: NORMAL
LVEF ECHO: NORMAL

## 2021-11-10 PROCEDURE — 93308 TTE F-UP OR LMTD: CPT | Mod: 26 | Performed by: INTERNAL MEDICINE

## 2021-11-10 PROCEDURE — 255N000002 HC RX 255 OP 636: Performed by: PHYSICIAN ASSISTANT

## 2021-11-10 PROCEDURE — 93325 DOPPLER ECHO COLOR FLOW MAPG: CPT

## 2021-11-10 PROCEDURE — 93325 DOPPLER ECHO COLOR FLOW MAPG: CPT | Mod: 26 | Performed by: INTERNAL MEDICINE

## 2021-11-10 PROCEDURE — 93321 DOPPLER ECHO F-UP/LMTD STD: CPT | Mod: 26 | Performed by: INTERNAL MEDICINE

## 2021-11-10 RX ADMIN — HUMAN ALBUMIN MICROSPHERES AND PERFLUTREN 3 ML: 10; .22 INJECTION, SOLUTION INTRAVENOUS at 11:41

## 2021-11-17 ENCOUNTER — OFFICE VISIT (OUTPATIENT)
Dept: CARDIOLOGY | Facility: CLINIC | Age: 61
End: 2021-11-17
Attending: PHYSICIAN ASSISTANT
Payer: COMMERCIAL

## 2021-11-17 VITALS
BODY MASS INDEX: 29.41 KG/M2 | OXYGEN SATURATION: 99 % | WEIGHT: 183 LBS | HEIGHT: 66 IN | SYSTOLIC BLOOD PRESSURE: 129 MMHG | HEART RATE: 62 BPM | DIASTOLIC BLOOD PRESSURE: 81 MMHG

## 2021-11-17 DIAGNOSIS — E78.5 HYPERLIPIDEMIA LDL GOAL <70: ICD-10-CM

## 2021-11-17 DIAGNOSIS — I50.20 HEART FAILURE WITH REDUCED EJECTION FRACTION, NYHA CLASS I (H): ICD-10-CM

## 2021-11-17 DIAGNOSIS — I25.5 ISCHEMIC CARDIOMYOPATHY: ICD-10-CM

## 2021-11-17 DIAGNOSIS — I25.10 CORONARY ARTERY DISEASE INVOLVING NATIVE CORONARY ARTERY OF NATIVE HEART WITHOUT ANGINA PECTORIS: ICD-10-CM

## 2021-11-17 DIAGNOSIS — I10 BENIGN ESSENTIAL HYPERTENSION: ICD-10-CM

## 2021-11-17 DIAGNOSIS — I10 ESSENTIAL HYPERTENSION: Chronic | ICD-10-CM

## 2021-11-17 PROCEDURE — 99214 OFFICE O/P EST MOD 30 MIN: CPT | Performed by: PHYSICIAN ASSISTANT

## 2021-11-17 RX ORDER — LOSARTAN POTASSIUM 25 MG/1
25 TABLET ORAL EVERY EVENING
Qty: 90 TABLET | Refills: 0 | COMMUNITY
Start: 2021-11-17 | End: 2023-07-20

## 2021-11-17 RX ORDER — SPIRONOLACTONE 25 MG/1
TABLET ORAL
Qty: 30 TABLET | Refills: 3 | Status: SHIPPED | OUTPATIENT
Start: 2021-11-17

## 2021-11-17 ASSESSMENT — MIFFLIN-ST. JEOR: SCORE: 1401.83

## 2021-11-17 NOTE — LETTER
11/17/2021    Cady Lay MD  [facility] [address]  [place] [address]    RE: Nighat Reyes       Dear Colleague,    I had the pleasure of seeing Nighat Reyes in the [facility].    Primary Cardiologist: Dr. Sandoval    Reason for Visit: 4 month follow up with repeat echo    History of Present Illness:   Nighat is a very pleasant 61-year-old female with past medical history notable for history of admission in 4/2021 with anterior STEMI (status post PCI of mid LAD that had thrombotic occlusion; she had residual high-grade RCA disease which was intervened upon on 5/3/2021, receiving 1 HASEEB to RPDA and 1 HASEEB to mid RCA; on aspirin and Brilinta), hypertension, hyperlipidemia, ischemic cardiomyopathy (NYHA class II; cardiac MRI during her admission in 4/2021 showed LVEF 44%; she is on metoprolol succinate 50 mg daily and losartan 25 mg daily), and T2DM (on insulin with hA1c improving ~10% to 6%). Family hx is not known as patient was adopted.    Nighat recently had a repeat echocardiogram which demonstrated persistent LV dysfunction mainly in the LAD distribution. Previous cMRI showed only 30% viability of the LAD territory. She appeared to have no valve normalities. Repeat lipid panel through PCP in 7/2021 showed LDL of 59, TC of 120, HDL of 43, and TG of 91. ALT was normal.    She returns to clinic, stating she is doing well. She denies chest discomfort, shortness of breath, orthopnea, PND, peripheral edema, or abdominal distention.    Assessment and Plan:  Nighat is a very pleasant 61-year-old female with past medical history notable for history of admission in 4/2021 with anterior STEMI (status post PCI of mid LAD that had thrombotic occlusion; she had residual high-grade RCA disease which was intervened upon on 5/3/2021, receiving 1 HASEEB to RPDA and 1 HASEEB to mid RCA; on aspirin and Brilinta), hypertension, hyperlipidemia, ischemic cardiomyopathy  (NYHA class II, cardiac MRI during her admission in 4/2021 showed LVEF 44%; she is on metoprolol succinate 50 mg daily and losartan 25 mg daily), and T2DM (on insulin with hA1c improving ~10% to 6%).    Nighat is stable from a cardiac standpoint.  Repeat limited echo shows persistent LV dysfunction with biplane measurement showing LVEF of 40% individual ejection fraction 35 to 40%.  She is on beta-blocker and ARB for her cardiomyopathy.  We will initiate low-dose spironolactone.  In order to minimize lightheadedness in the morning we will have her move losartan to evening time.  In the near future we will consider switching her from losartan to low-dose Entresto.  Recent lipid panel shows excellent control of her numbers.  We will continue with atorvastatin 40 mg daily.  She will continue to be on aspirin and Brilinta.  I have asked her to return in 4/2022 at which time we will discontinue her Brilinta.  She has no symptoms to suggest hypervolemia, arrhythmia, or angina.  Her chest burning has resolved with PPI therapy.      This note was completed in part using Dragon voice recognition software. Although reviewed after completion, some word and grammatical errors may occur.    Orders this Visit:  Orders Placed This Encounter   Procedures     Basic metabolic panel     Orders Placed This Encounter   Medications     spironolactone (ALDACTONE) 25 MG tablet     Sig: Take half tablet (12.5 mg) every morning.     Dispense:  30 tablet     Refill:  3     losartan (COZAAR) 25 MG tablet     Sig: Take 1 tablet (25 mg) by mouth every evening     Dispense:  90 tablet     Refill:  0     Medications Discontinued During This Encounter   Medication Reason     losartan (COZAAR) 25 MG tablet          Encounter Diagnoses   Name Primary?     Coronary artery disease involving native coronary artery of native heart without angina pectoris      Hyperlipidemia LDL goal <70      Benign essential hypertension      Ischemic cardiomyopathy       Heart failure with reduced ejection fraction, NYHA class I (H)      Essential hypertension        CURRENT MEDICATIONS:  Current Outpatient Medications   Medication Sig Dispense Refill     aspirin (ASA) 81 MG EC tablet Take 1 tablet (81 mg) by mouth daily 90 tablet 3     atorvastatin (LIPITOR) 40 MG tablet Take 1 tablet (40 mg) by mouth every evening 90 tablet 3     insulin glargine (BASAGLAR KWIKPEN) 100 UNIT/ML pen Inject 18 Units Subcutaneous At Bedtime       losartan (COZAAR) 25 MG tablet Take 1 tablet (25 mg) by mouth every evening 90 tablet 0     metFORMIN (GLUCOPHAGE) 1000 MG tablet Take 1,000 mg by mouth 2 times daily (with meals)       metoprolol succinate ER (TOPROL-XL) 50 MG 24 hr tablet Take 1 tablet (50 mg) by mouth daily 90 tablet 0     nitroGLYcerin (NITROSTAT) 0.4 MG sublingual tablet For chest pain place 1 tablet under the tongue every 5 minutes for 3 doses. If symptoms persist 5 minutes after 1st dose call 911. 30 tablet 0     omeprazole (PRILOSEC) 20 MG DR capsule Take 1 capsule (20 mg) by mouth daily 90 capsule 3     sitagliptin (JANUVIA) 100 MG tablet Take 100 mg by mouth daily       spironolactone (ALDACTONE) 25 MG tablet Take half tablet (12.5 mg) every morning. 30 tablet 3     ticagrelor (BRILINTA) 90 MG tablet Take 1 tablet (90 mg) by mouth every 12 hours 180 tablet 3       ALLERGIES     Allergies   Allergen Reactions     Lisinopril        PAST MEDICAL HISTORY:  Past Medical History:   Diagnosis Date     Diabetes mellitus, type II (H) 04/14/2006    Uncontrolled     Essential hypertension 04/14/2006     Hyperlipidemia 04/14/2006     Ischemic cardiomyopathy      Patent ductus arteriosus     s/p surgical repair     ST elevation MI (STEMI) (H) 04/04/2021    s/p HASEEB to mid LAD     ST elevation myocardial infarction involving left anterior descending (LAD) coronary artery (H) 04/04/2021     Tobacco use        PAST SURGICAL HISTORY:  Past Surgical History:   Procedure Laterality Date     CV CORONARY  ANGIOGRAM N/A 4/4/2021    Procedure: Coronary Angiogram;  Surgeon: Yusuf Maldonado MD;  Location: [facility] CARDIAC CATH LAB     CV HEART CATHETERIZATION WITH POSSIBLE INTERVENTION N/A 5/3/2021    Procedure: Heart Catheterization with Possible Intervention;  Surgeon: Alli Sandoval MD;  Location: [facility] CARDIAC CATH LAB     CV PCI STENT DRUG ELUTING N/A 4/4/2021    Procedure: Percutaneous Coronary Intervention Stent Drug Eluting;  Surgeon: Yusuf Maldonado MD;  Location: [facility] CARDIAC CATH LAB       FAMILY HISTORY:  Family History   Problem Relation Age of Onset     No Known Problems Mother      No Known Problems Father      No Known Problems Brother      No Known Problems Sister      Hypertension Son        SOCIAL HISTORY:  Social History     Socioeconomic History     Marital status:      Spouse name: None     Number of children: None     Years of education: None     Highest education level: None   Occupational History     None   Tobacco Use     Smoking status: Never Smoker     Smokeless tobacco: Never Used   Substance and Sexual Activity     Alcohol use: Yes     Comment: limited     Drug use: None     Sexual activity: None   Other Topics Concern     Parent/sibling w/ CABG, MI or angioplasty before 65F 55M? Not Asked   Social History Narrative     None     Social Determinants of Health     Financial Resource Strain: Not on file   Food Insecurity: Not on file   Transportation Needs: Not on file   Physical Activity: Not on file   Stress: Not on file   Social Connections: Not on file   Intimate Partner Violence: Not on file   Housing Stability: Not on file       Review of Systems:  Skin:  Negative     Eyes:  Negative    ENT:  Negative    Respiratory:  Negative    Cardiovascular:    edema;Positive for  Gastroenterology: Positive for reflux  Genitourinary:  Negative    Musculoskeletal:  Negative    Neurologic:  Negative    Psychiatric:  Negative    Heme/Lymph/Imm:  Negative    Endocrine:   "Positive for diabetes    Physical Exam:  Vitals: /81   Pulse 62   Ht 1.676 m (5' 6\")   Wt 83 kg (183 lb)   SpO2 99%   BMI 29.54 kg/m       GEN:  NAD  NECK: No JVD  C/V:  Regular rate and rhythm, no murmur, rub or gallop.  RESP: CTA, james.    GI: Abdomen soft, nontender, nondistended.  EXTREM: No LE edema.   NEURO: Alert and oriented, cooperative. No obvious focal deficits.   PSYCH: Normal affect.  SKIN: Warm and dry.       Recent Lab Results:  LIPID RESULTS:  Lab Results   Component Value Date    CHOL 120 07/12/2021    HDL 43 07/12/2021    LDL 59 07/12/2021     (H) 04/04/2021    TRIG 91 07/12/2021       LIVER ENZYME RESULTS:  Lab Results   Component Value Date    AST 44 04/07/2021    ALT 29 04/07/2021       CBC RESULTS:  Lab Results   Component Value Date    WBC 5.5 05/03/2021    RBC 4.05 05/03/2021    HGB 12.2 05/03/2021    HCT 37.8 05/03/2021    MCV 93 05/03/2021    MCH 30.1 05/03/2021    MCHC 32.3 05/03/2021    RDW 13.8 05/03/2021     05/03/2021       BMP RESULTS:  Lab Results   Component Value Date     07/12/2021    POTASSIUM 4.6 07/12/2021    CHLORIDE 102 07/12/2021    CO2 22 07/12/2021    ANIONGAP 5 05/03/2021     (A) 07/12/2021    BUN 9 07/12/2021    CR 0.79 07/12/2021    GFRESTIMATED 87 05/03/2021    GFRESTBLACK >90 05/03/2021    LEYAL 9.5 07/12/2021        A1C RESULTS:  Lab Results   Component Value Date    A1C 10.9 (H) 04/04/2021       INR RESULTS:  Lab Results   Component Value Date    INR 0.98 05/03/2021    INR 1.19 (H) 04/07/2021           Tiago Brewster PA-C  November 17, 2021     Thank you for allowing me to participate in the care of your patient.      Sincerely,     Tiago Brewster PA-C     [facility]  cc:   Tiago Brewster PA-C  [address] [place] [address]        "

## 2021-11-17 NOTE — PROGRESS NOTES
Primary Cardiologist: Dr. Sandoval    Reason for Visit: 4 month follow up with repeat echo    History of Present Illness:   Nighat is a very pleasant 61-year-old female with past medical history notable for history of admission in 4/2021 with anterior STEMI (status post PCI of mid LAD that had thrombotic occlusion; she had residual high-grade RCA disease which was intervened upon on 5/3/2021, receiving 1 HASEEB to RPDA and 1 HASEEB to mid RCA; on aspirin and Brilinta), hypertension, hyperlipidemia, ischemic cardiomyopathy (NYHA class II; cardiac MRI during her admission in 4/2021 showed LVEF 44%; she is on metoprolol succinate 50 mg daily and losartan 25 mg daily), and T2DM (on insulin with hA1c improving ~10% to 6%). Family hx is not known as patient was adopted.    Nighat recently had a repeat echocardiogram which demonstrated persistent LV dysfunction mainly in the LAD distribution. Previous cMRI showed only 30% viability of the LAD territory. She appeared to have no valve normalities. Repeat lipid panel through PCP in 7/2021 showed LDL of 59, TC of 120, HDL of 43, and TG of 91. ALT was normal.    She returns to clinic, stating she is doing well. She denies chest discomfort, shortness of breath, orthopnea, PND, peripheral edema, or abdominal distention.    Assessment and Plan:  Nighat is a very pleasant 61-year-old female with past medical history notable for history of admission in 4/2021 with anterior STEMI (status post PCI of mid LAD that had thrombotic occlusion; she had residual high-grade RCA disease which was intervened upon on 5/3/2021, receiving 1 HASEEB to RPDA and 1 HASEEB to mid RCA; on aspirin and Brilinta), hypertension, hyperlipidemia, ischemic cardiomyopathy (NYHA class II, cardiac MRI during her admission in 4/2021 showed LVEF 44%; she is on metoprolol succinate 50 mg daily and losartan 25 mg daily), and T2DM (on insulin with hA1c improving ~10% to 6%).    Nighat is stable from a cardiac standpoint.  Repeat limited echo  shows persistent LV dysfunction with biplane measurement showing LVEF of 40% individual ejection fraction 35 to 40%.  She is on beta-blocker and ARB for her cardiomyopathy.  We will initiate low-dose spironolactone.  In order to minimize lightheadedness in the morning we will have her move losartan to evening time.  In the near future we will consider switching her from losartan to low-dose Entresto.  Recent lipid panel shows excellent control of her numbers.  We will continue with atorvastatin 40 mg daily.  She will continue to be on aspirin and Brilinta.  I have asked her to return in 4/2022 at which time we will discontinue her Brilinta.  She has no symptoms to suggest hypervolemia, arrhythmia, or angina.  Her chest burning has resolved with PPI therapy.      This note was completed in part using Dragon voice recognition software. Although reviewed after completion, some word and grammatical errors may occur.    Orders this Visit:  Orders Placed This Encounter   Procedures     Basic metabolic panel     Orders Placed This Encounter   Medications     spironolactone (ALDACTONE) 25 MG tablet     Sig: Take half tablet (12.5 mg) every morning.     Dispense:  30 tablet     Refill:  3     losartan (COZAAR) 25 MG tablet     Sig: Take 1 tablet (25 mg) by mouth every evening     Dispense:  90 tablet     Refill:  0     Medications Discontinued During This Encounter   Medication Reason     losartan (COZAAR) 25 MG tablet          Encounter Diagnoses   Name Primary?     Coronary artery disease involving native coronary artery of native heart without angina pectoris      Hyperlipidemia LDL goal <70      Benign essential hypertension      Ischemic cardiomyopathy      Heart failure with reduced ejection fraction, NYHA class I (H)      Essential hypertension        CURRENT MEDICATIONS:  Current Outpatient Medications   Medication Sig Dispense Refill     aspirin (ASA) 81 MG EC tablet Take 1 tablet (81 mg) by mouth daily 90 tablet 3      atorvastatin (LIPITOR) 40 MG tablet Take 1 tablet (40 mg) by mouth every evening 90 tablet 3     insulin glargine (BASAGLAR KWIKPEN) 100 UNIT/ML pen Inject 18 Units Subcutaneous At Bedtime       losartan (COZAAR) 25 MG tablet Take 1 tablet (25 mg) by mouth every evening 90 tablet 0     metFORMIN (GLUCOPHAGE) 1000 MG tablet Take 1,000 mg by mouth 2 times daily (with meals)       metoprolol succinate ER (TOPROL-XL) 50 MG 24 hr tablet Take 1 tablet (50 mg) by mouth daily 90 tablet 0     nitroGLYcerin (NITROSTAT) 0.4 MG sublingual tablet For chest pain place 1 tablet under the tongue every 5 minutes for 3 doses. If symptoms persist 5 minutes after 1st dose call 911. 30 tablet 0     omeprazole (PRILOSEC) 20 MG DR capsule Take 1 capsule (20 mg) by mouth daily 90 capsule 3     sitagliptin (JANUVIA) 100 MG tablet Take 100 mg by mouth daily       spironolactone (ALDACTONE) 25 MG tablet Take half tablet (12.5 mg) every morning. 30 tablet 3     ticagrelor (BRILINTA) 90 MG tablet Take 1 tablet (90 mg) by mouth every 12 hours 180 tablet 3       ALLERGIES     Allergies   Allergen Reactions     Lisinopril        PAST MEDICAL HISTORY:  Past Medical History:   Diagnosis Date     Diabetes mellitus, type II (H) 04/14/2006    Uncontrolled     Essential hypertension 04/14/2006     Hyperlipidemia 04/14/2006     Ischemic cardiomyopathy      Patent ductus arteriosus     s/p surgical repair     ST elevation MI (STEMI) (H) 04/04/2021    s/p HASEEB to mid LAD     ST elevation myocardial infarction involving left anterior descending (LAD) coronary artery (H) 04/04/2021     Tobacco use        PAST SURGICAL HISTORY:  Past Surgical History:   Procedure Laterality Date     CV CORONARY ANGIOGRAM N/A 4/4/2021    Procedure: Coronary Angiogram;  Surgeon: Yusuf Maldonado MD;  Location:  [facility]     CV HEART CATHETERIZATION WITH POSSIBLE INTERVENTION N/A 5/3/2021    Procedure: Heart Catheterization with Possible Intervention;   "Surgeon: Alli Sandoval MD;  Location:  [facility]     CV PCI STENT DRUG ELUTING N/A 4/4/2021    Procedure: Percutaneous Coronary Intervention Stent Drug Eluting;  Surgeon: Yusuf Maldonado MD;  Location:  [facility]       FAMILY HISTORY:  Family History   Problem Relation Age of Onset     No Known Problems Mother      No Known Problems Father      No Known Problems Brother      No Known Problems Sister      Hypertension Son        SOCIAL HISTORY:  Social History     Socioeconomic History     Marital status:      Spouse name: None     Number of children: None     Years of education: None     Highest education level: None   Occupational History     None   Tobacco Use     Smoking status: Never Smoker     Smokeless tobacco: Never Used   Substance and Sexual Activity     Alcohol use: Yes     Comment: limited     Drug use: None     Sexual activity: None   Other Topics Concern     Parent/sibling w/ CABG, MI or angioplasty before 65F 55M? Not Asked   Social History Narrative     None     Social Determinants of Health     Financial Resource Strain: Not on file   Food Insecurity: Not on file   Transportation Needs: Not on file   Physical Activity: Not on file   Stress: Not on file   Social Connections: Not on file   Intimate Partner Violence: Not on file   Housing Stability: Not on file       Review of Systems:  Skin:  Negative     Eyes:  Negative    ENT:  Negative    Respiratory:  Negative    Cardiovascular:    edema;Positive for  Gastroenterology: Positive for reflux  Genitourinary:  Negative    Musculoskeletal:  Negative    Neurologic:  Negative    Psychiatric:  Negative    Heme/Lymph/Imm:  Negative    Endocrine:  Positive for diabetes    Physical Exam:  Vitals: /81   Pulse 62   Ht 1.676 m (5' 6\")   Wt 83 kg (183 lb)   SpO2 99%   BMI 29.54 kg/m       GEN:  NAD  NECK: No JVD  C/V:  Regular rate and rhythm, no murmur, rub or gallop.  RESP: CTA, james.    GI: Abdomen soft, " nontender, nondistended.  EXTREM: No LE edema.   NEURO: Alert and oriented, cooperative. No obvious focal deficits.   PSYCH: Normal affect.  SKIN: Warm and dry.       Recent Lab Results:  LIPID RESULTS:  Lab Results   Component Value Date    CHOL 120 07/12/2021    HDL 43 07/12/2021    LDL 59 07/12/2021     (H) 04/04/2021    TRIG 91 07/12/2021       LIVER ENZYME RESULTS:  Lab Results   Component Value Date    AST 44 04/07/2021    ALT 29 04/07/2021       CBC RESULTS:  Lab Results   Component Value Date    WBC 5.5 05/03/2021    RBC 4.05 05/03/2021    HGB 12.2 05/03/2021    HCT 37.8 05/03/2021    MCV 93 05/03/2021    MCH 30.1 05/03/2021    MCHC 32.3 05/03/2021    RDW 13.8 05/03/2021     05/03/2021       BMP RESULTS:  Lab Results   Component Value Date     07/12/2021    POTASSIUM 4.6 07/12/2021    CHLORIDE 102 07/12/2021    CO2 22 07/12/2021    ANIONGAP 5 05/03/2021     (A) 07/12/2021    BUN 9 07/12/2021    CR 0.79 07/12/2021    GFRESTIMATED 87 05/03/2021    GFRESTBLACK >90 05/03/2021    LEYLA 9.5 07/12/2021        A1C RESULTS:  Lab Results   Component Value Date    A1C 10.9 (H) 04/04/2021       INR RESULTS:  Lab Results   Component Value Date    INR 0.98 05/03/2021    INR 1.19 (H) 04/07/2021           Tiago Brewster PA-C  November 17, 2021

## 2021-11-17 NOTE — PATIENT INSTRUCTIONS
Today's Plan:   1) Start spironolactone -- take half tablet (12.5 mg) once every morning.   2) Move losartan tablet to evening time.   3) Get blood work (basic metabolic panel) in 1-2 weeks (you don't need to fast for this).    4) Come back in 4 months for follow up.     If you have questions or concerns please call my nurse team at [phone].     Scheduling phone number: [phone].  Reminder: Please bring in all current medications, over the counter supplements and vitamin bottles to your next appointment.    It was a pleasure seeing you today!     Tiago Brewster PA-C  11/17/2021

## 2021-11-28 ENCOUNTER — HEALTH MAINTENANCE LETTER (OUTPATIENT)
Age: 61
End: 2021-11-28

## 2022-03-17 NOTE — PROGRESS NOTES
Primary Cardiologist: Dr. Sandoval    Reason for Visit: Cardiomyopathy management and discussion of discontinuing Brilinta    History of Present Illness:   Nighat is a very pleasant 62-year-old female with past medical history notable for history of admission in 4/2021 with anterior STEMI (status post PCI of mid LAD that had thrombotic occlusion; she had residual high-grade RCA disease which was intervened upon on 5/3/2021, receiving 1 HASEEB to RPDA and 1 HASEEB to mid RCA; on aspirin and Brilinta), hypertension, hyperlipidemia, ischemic cardiomyopathy (NYHA class II, cardiac MRI during her admission in 4/2021 showed LVEF 44%; she is on metoprolol succinate 50 mg daily and losartan 25 mg daily), and T2DM (on insulin with hA1c improving ~10% to 7.6%).    Nighat had ankle surgery recently which went well.  She denies any significant pain or complication from this.  She tells me that she forgot to take some of her cardiomyopathy medications yesterday but she took all of her medications this morning.  She denies any symptoms of chest discomfort, shortness of breath, orthopnea, abdominal distention, peripheral edema, or bleeding issues.    Assessment and Plan:  Nighat is a very pleasant 62-year-old female with past medical history notable for history of admission in 4/2021 with anterior STEMI (status post PCI of mid LAD that had thrombotic occlusion; she had residual high-grade RCA disease which was intervened upon on 5/3/2021, receiving 1 HASEEB to RPDA and 1 HASEEB to mid RCA; on aspirin and Brilinta), hypertension, hyperlipidemia, ischemic cardiomyopathy (NYHA class II, cardiac MRI during her admission in 4/2021 showed LVEF 44%; she is on metoprolol succinate 50 mg daily and losartan 25 mg daily), and T2DM (on insulin with hA1c improving ~10% to 7.6%).    Nighat appears to be doing well from a cardiac standpoint.  She has no symptoms to suggest acute heart failure.  I have recommended that we increase her losartan to 50 mg daily.  She will  take this in the evening time as this is how she has been doing it.  I have told her to notify me if she has any lightheadedness.  We will have her return to clinic in a few weeks with repeat BMP.    This note was completed in part using Dragon voice recognition software. Although reviewed after completion, some word and grammatical errors may occur.    Orders this Visit:  Orders Placed This Encounter   Procedures     Basic metabolic panel     Follow-Up with Cardiology AISHWARYA     Orders Placed This Encounter   Medications     losartan (COZAAR) 50 MG tablet     Sig: Take 1 tablet (50 mg) by mouth every evening     Dispense:  90 tablet     Refill:  3     There are no discontinued medications.      Encounter Diagnoses   Name Primary?     Coronary artery disease involving native coronary artery of native heart without angina pectoris      Ischemic cardiomyopathy Yes     Benign essential hypertension      Hyperlipidemia LDL goal <70      Heart failure with reduced ejection fraction, NYHA class I (H)      Essential hypertension        CURRENT MEDICATIONS:  Current Outpatient Medications   Medication Sig Dispense Refill     aspirin (ASA) 81 MG EC tablet Take 1 tablet (81 mg) by mouth daily 90 tablet 3     atorvastatin (LIPITOR) 40 MG tablet Take 1 tablet (40 mg) by mouth every evening 90 tablet 3     insulin glargine (BASAGLAR KWIKPEN) 100 UNIT/ML pen Inject 18 Units Subcutaneous At Bedtime       losartan (COZAAR) 25 MG tablet Take 1 tablet (25 mg) by mouth every evening 90 tablet 0     losartan (COZAAR) 50 MG tablet Take 1 tablet (50 mg) by mouth every evening 90 tablet 3     metFORMIN (GLUCOPHAGE) 1000 MG tablet Take 1,000 mg by mouth 2 times daily (with meals)       metoprolol succinate ER (TOPROL-XL) 50 MG 24 hr tablet Take 1 tablet (50 mg) by mouth daily 90 tablet 0     nitroGLYcerin (NITROSTAT) 0.4 MG sublingual tablet For chest pain place 1 tablet under the tongue every 5 minutes for 3 doses. If symptoms persist 5  minutes after 1st dose call 911. 30 tablet 0     omeprazole (PRILOSEC) 20 MG DR capsule Take 1 capsule (20 mg) by mouth daily 90 capsule 3     sitagliptin (JANUVIA) 100 MG tablet Take 100 mg by mouth daily       spironolactone (ALDACTONE) 25 MG tablet Take half tablet (12.5 mg) every morning. 30 tablet 3     ticagrelor (BRILINTA) 90 MG tablet Take 1 tablet (90 mg) by mouth every 12 hours 180 tablet 3       ALLERGIES     Allergies   Allergen Reactions     Lisinopril        PAST MEDICAL HISTORY:  Past Medical History:   Diagnosis Date     Diabetes mellitus, type II (H) 04/14/2006    Uncontrolled     Essential hypertension 04/14/2006     Hyperlipidemia 04/14/2006     Ischemic cardiomyopathy      Patent ductus arteriosus     s/p surgical repair     ST elevation MI (STEMI) (H) 04/04/2021    s/p HASEEB to mid LAD     ST elevation myocardial infarction involving left anterior descending (LAD) coronary artery (H) 04/04/2021     Tobacco use        PAST SURGICAL HISTORY:  Past Surgical History:   Procedure Laterality Date     CV CORONARY ANGIOGRAM N/A 4/4/2021    Procedure: Coronary Angiogram;  Surgeon: Yusuf Maldonado MD;  Location: [facility] CARDIAC CATH [facility]     CV HEART CATHETERIZATION WITH POSSIBLE INTERVENTION N/A 5/3/2021    Procedure: Heart Catheterization with Possible Intervention;  Surgeon: Alli Sandoval MD;  Location: [facility] CARDIAC CATH LAB     CV PCI STENT DRUG ELUTING N/A 4/4/2021    Procedure: Percutaneous Coronary Intervention Stent Drug Eluting;  Surgeon: Yusuf Maldonado MD;  Location: [facility] CARDIAC CATH LAB       FAMILY HISTORY:  Family History   Problem Relation Age of Onset     No Known Problems Mother      No Known Problems Father      No Known Problems Brother      No Known Problems Sister      Hypertension Son        SOCIAL HISTORY:  Social History     Socioeconomic History     Marital status:      Spouse name: None     Number of children: None     Years of education: None      "Highest education level: None   Occupational History     None   Tobacco Use     Smoking status: Never Smoker     Smokeless tobacco: Never Used   Substance and Sexual Activity     Alcohol use: Yes     Comment: limited     Drug use: None     Sexual activity: None   Other Topics Concern     Parent/sibling w/ CABG, MI or angioplasty before 65F 55M? Not Asked   Social History Narrative     None     Social Determinants of Health     Financial Resource Strain: Not on file   Food Insecurity: Not on file   Transportation Needs: Not on file   Physical Activity: Not on file   Stress: Not on file   Social Connections: Not on file   Intimate Partner Violence: Not on file   Housing Stability: Not on file       Review of Systems:  Skin:  Negative     Eyes:  Negative contacts  ENT:  Negative    Respiratory:  Negative    Cardiovascular:  Negative edema;Positive for  Gastroenterology: Positive for reflux  Genitourinary:  Negative    Musculoskeletal:  Negative    Neurologic:  Negative headaches  Psychiatric:  Negative    Heme/Lymph/Imm:  Negative allergies  Endocrine:  Positive for diabetes    Physical Exam:  Vitals: BP (!) 143/80   Pulse 94   Ht 1.676 m (5' 6\")   Wt 80.7 kg (178 lb)   SpO2 98%   BMI 28.73 kg/m       GEN:  NAD  NECK: No JVD  C/V:  Regular rate and rhythm, no murmur, rub or gallop.  RESP: Clear to auscultation bilaterally without wheezing, rales, or rhonchi.  GI: Abdomen soft, nontender, nondistended. No HSM appreciated.   EXTREM: No pitting LE edema.   NEURO: Alert and oriented, cooperative. No obvious focal deficits.   PSYCH: Normal affect.  SKIN: Warm and dry.       Recent Lab Results:  LIPID RESULTS:  Lab Results   Component Value Date    CHOL 120 07/12/2021    HDL 43 07/12/2021    LDL 59 07/12/2021     (H) 04/04/2021    TRIG 91 07/12/2021       LIVER ENZYME RESULTS:  Lab Results   Component Value Date    AST 44 04/07/2021    ALT 29 04/07/2021       CBC RESULTS:  Lab Results   Component Value Date    " WBC 5.5 05/03/2021    RBC 4.05 05/03/2021    HGB 12.2 05/03/2021    HCT 37.8 05/03/2021    MCV 93 05/03/2021    MCH 30.1 05/03/2021    MCHC 32.3 05/03/2021    RDW 13.8 05/03/2021     05/03/2021       BMP RESULTS:  Lab Results   Component Value Date     07/12/2021    POTASSIUM 4.6 07/12/2021    CHLORIDE 102 07/12/2021    CO2 22 07/12/2021    ANIONGAP 5 05/03/2021     (A) 07/12/2021    BUN 9 07/12/2021    CR 0.79 07/12/2021    GFRESTIMATED 87 05/03/2021    GFRESTBLACK >90 05/03/2021    LEYLA 9.5 07/12/2021        A1C RESULTS:  Lab Results   Component Value Date    A1C 10.9 (H) 04/04/2021       INR RESULTS:  Lab Results   Component Value Date    INR 0.98 05/03/2021    INR 1.19 (H) 04/07/2021           Tiago Brewster PA-C  March 17, 2022

## 2022-03-18 ENCOUNTER — OFFICE VISIT (OUTPATIENT)
Dept: CARDIOLOGY | Facility: CLINIC | Age: 62
End: 2022-03-18
Payer: COMMERCIAL

## 2022-03-18 VITALS
WEIGHT: 178 LBS | DIASTOLIC BLOOD PRESSURE: 80 MMHG | BODY MASS INDEX: 28.61 KG/M2 | OXYGEN SATURATION: 98 % | SYSTOLIC BLOOD PRESSURE: 143 MMHG | HEIGHT: 66 IN | HEART RATE: 94 BPM

## 2022-03-18 DIAGNOSIS — E78.5 HYPERLIPIDEMIA LDL GOAL <70: ICD-10-CM

## 2022-03-18 DIAGNOSIS — I25.5 ISCHEMIC CARDIOMYOPATHY: Primary | ICD-10-CM

## 2022-03-18 DIAGNOSIS — I10 ESSENTIAL HYPERTENSION: Chronic | ICD-10-CM

## 2022-03-18 DIAGNOSIS — I10 BENIGN ESSENTIAL HYPERTENSION: ICD-10-CM

## 2022-03-18 DIAGNOSIS — I50.20 HEART FAILURE WITH REDUCED EJECTION FRACTION, NYHA CLASS I (H): ICD-10-CM

## 2022-03-18 DIAGNOSIS — I25.10 CORONARY ARTERY DISEASE INVOLVING NATIVE CORONARY ARTERY OF NATIVE HEART WITHOUT ANGINA PECTORIS: ICD-10-CM

## 2022-03-18 PROCEDURE — 99214 OFFICE O/P EST MOD 30 MIN: CPT | Performed by: PHYSICIAN ASSISTANT

## 2022-03-18 RX ORDER — LOSARTAN POTASSIUM 50 MG/1
50 TABLET ORAL EVERY EVENING
Qty: 90 TABLET | Refills: 3 | Status: SHIPPED | OUTPATIENT
Start: 2022-03-18 | End: 2023-07-25

## 2022-03-18 NOTE — PATIENT INSTRUCTIONS
Today's Plan:   1) Increase losartan to 50 mg every evening.   2) Continue with exercise.   3) Follow up with me in 3 weeks with repeat non-fasting blood work.  4) We will work on increasing your heart medications.   5) Jardiance (diabetes medication) may be a good medication for you going forward.    If you have questions or concerns please call my nurse team at [phone].     Scheduling phone number: [phone].  Reminder: Please bring in all current medications, over the counter supplements and vitamin bottles to your next appointment.    It was a pleasure seeing you today!     Tiago Brewster PA-C  3/18/2022

## 2022-03-18 NOTE — LETTER
3/18/2022    Cady Lay MD  [facility] [address]  [facility] [address]    RE: Nighat DRAPER Amy       Dear Colleague,     I had the pleasure of seeing Nighat Reyes in the [facility].  Primary Cardiologist: Dr. Sandoval    Reason for Visit: Cardiomyopathy management and discussion of discontinuing Brilinta    History of Present Illness:   Nighat is a very pleasant 62-year-old female with past medical history notable for history of admission in 4/2021 with anterior STEMI (status post PCI of mid LAD that had thrombotic occlusion; she had residual high-grade RCA disease which was intervened upon on 5/3/2021, receiving 1 HASEEB to RPDA and 1 HASEEB to mid RCA; on aspirin and Brilinta), hypertension, hyperlipidemia, ischemic cardiomyopathy (NYHA class II, cardiac MRI during her admission in 4/2021 showed LVEF 44%; she is on metoprolol succinate 50 mg daily and losartan 25 mg daily), and T2DM (on insulin with hA1c improving ~10% to 7.6%).    Nighat had ankle surgery recently which went well.  She denies any significant pain or complication from this.  She tells me that she forgot to take some of her cardiomyopathy medications yesterday but she took all of her medications this morning.  She denies any symptoms of chest discomfort, shortness of breath, orthopnea, abdominal distention, peripheral edema, or bleeding issues.    Assessment and Plan:  Nighat is a very pleasant 62-year-old female with past medical history notable for history of admission in 4/2021 with anterior STEMI (status post PCI of mid LAD that had thrombotic occlusion; she had residual high-grade RCA disease which was intervened upon on 5/3/2021, receiving 1 HASEEB to RPDA and 1 HASEEB to mid RCA; on aspirin and Brilinta), hypertension, hyperlipidemia, ischemic cardiomyopathy (NYHA class II, cardiac MRI during her admission in 4/2021 showed LVEF 44%; she is on metoprolol succinate 50 mg daily and losartan 25 mg daily), and T2DM (on  insulin with hA1c improving ~10% to 7.6%).    Nighat appears to be doing well from a cardiac standpoint.  She has no symptoms to suggest acute heart failure.  I have recommended that we increase her losartan to 50 mg daily.  She will take this in the evening time as this is how she has been doing it.  I have told her to notify me if she has any lightheadedness.  We will have her return to clinic in a few weeks with repeat BMP.    This note was completed in part using Dragon voice recognition software. Although reviewed after completion, some word and grammatical errors may occur.    Orders this Visit:  Orders Placed This Encounter   Procedures     Basic metabolic panel     Follow-Up with Cardiology AISHWARYA     Orders Placed This Encounter   Medications     losartan (COZAAR) 50 MG tablet     Sig: Take 1 tablet (50 mg) by mouth every evening     Dispense:  90 tablet     Refill:  3     There are no discontinued medications.      Encounter Diagnoses   Name Primary?     Coronary artery disease involving native coronary artery of native heart without angina pectoris      Ischemic cardiomyopathy Yes     Benign essential hypertension      Hyperlipidemia LDL goal <70      Heart failure with reduced ejection fraction, NYHA class I (H)      Essential hypertension        CURRENT MEDICATIONS:  Current Outpatient Medications   Medication Sig Dispense Refill     aspirin (ASA) 81 MG EC tablet Take 1 tablet (81 mg) by mouth daily 90 tablet 3     atorvastatin (LIPITOR) 40 MG tablet Take 1 tablet (40 mg) by mouth every evening 90 tablet 3     insulin glargine (BASAGLAR KWIKPEN) 100 UNIT/ML pen Inject 18 Units Subcutaneous At Bedtime       losartan (COZAAR) 25 MG tablet Take 1 tablet (25 mg) by mouth every evening 90 tablet 0     losartan (COZAAR) 50 MG tablet Take 1 tablet (50 mg) by mouth every evening 90 tablet 3     metFORMIN (GLUCOPHAGE) 1000 MG tablet Take 1,000 mg by mouth 2 times daily (with meals)       metoprolol succinate ER  (TOPROL-XL) 50 MG 24 hr tablet Take 1 tablet (50 mg) by mouth daily 90 tablet 0     nitroGLYcerin (NITROSTAT) 0.4 MG sublingual tablet For chest pain place 1 tablet under the tongue every 5 minutes for 3 doses. If symptoms persist 5 minutes after 1st dose call 911. 30 tablet 0     omeprazole (PRILOSEC) 20 MG DR capsule Take 1 capsule (20 mg) by mouth daily 90 capsule 3     sitagliptin (JANUVIA) 100 MG tablet Take 100 mg by mouth daily       spironolactone (ALDACTONE) 25 MG tablet Take half tablet (12.5 mg) every morning. 30 tablet 3     ticagrelor (BRILINTA) 90 MG tablet Take 1 tablet (90 mg) by mouth every 12 hours 180 tablet 3       ALLERGIES     Allergies   Allergen Reactions     Lisinopril        PAST MEDICAL HISTORY:  Past Medical History:   Diagnosis Date     Diabetes mellitus, type II (H) 04/14/2006    Uncontrolled     Essential hypertension 04/14/2006     Hyperlipidemia 04/14/2006     Ischemic cardiomyopathy      Patent ductus arteriosus     s/p surgical repair     ST elevation MI (STEMI) (H) 04/04/2021    s/p HASEEB to mid LAD     ST elevation myocardial infarction involving left anterior descending (LAD) coronary artery (H) 04/04/2021     Tobacco use        PAST SURGICAL HISTORY:  Past Surgical History:   Procedure Laterality Date     CV CORONARY ANGIOGRAM N/A 4/4/2021    Procedure: Coronary Angiogram;  Surgeon: Yusuf Maldonado MD;  Location: [facility] CARDIAC CATH LAB     CV HEART CATHETERIZATION WITH POSSIBLE INTERVENTION N/A 5/3/2021    Procedure: Heart Catheterization with Possible Intervention;  Surgeon: Alli Sandoval MD;  Location: [facility] CARDIAC CATH LAB     CV PCI STENT DRUG ELUTING N/A 4/4/2021    Procedure: Percutaneous Coronary Intervention Stent Drug Eluting;  Surgeon: Yusuf Maldonado MD;  Location: [facility] CARDIAC CATH LAB       FAMILY HISTORY:  Family History   Problem Relation Age of Onset     No Known Problems Mother      No Known Problems Father      No Known Problems  "Brother      No Known Problems Sister      Hypertension Son        SOCIAL HISTORY:  Social History     Socioeconomic History     Marital status:      Spouse name: None     Number of children: None     Years of education: None     Highest education level: None   Occupational History     None   Tobacco Use     Smoking status: Never Smoker     Smokeless tobacco: Never Used   Substance and Sexual Activity     Alcohol use: Yes     Comment: limited     Drug use: None     Sexual activity: None   Other Topics Concern     Parent/sibling w/ CABG, MI or angioplasty before 65F 55M? Not Asked   Social History Narrative     None     Social Determinants of Health     Financial Resource Strain: Not on file   Food Insecurity: Not on file   Transportation Needs: Not on file   Physical Activity: Not on file   Stress: Not on file   Social Connections: Not on file   Intimate Partner Violence: Not on file   Housing Stability: Not on file       Review of Systems:  Skin:  Negative     Eyes:  Negative contacts  ENT:  Negative    Respiratory:  Negative    Cardiovascular:  Negative edema;Positive for  Gastroenterology: Positive for reflux  Genitourinary:  Negative    Musculoskeletal:  Negative    Neurologic:  Negative headaches  Psychiatric:  Negative    Heme/Lymph/Imm:  Negative allergies  Endocrine:  Positive for diabetes    Physical Exam:  Vitals: BP (!) 143/80   Pulse 94   Ht 1.676 m (5' 6\")   Wt 80.7 kg (178 lb)   SpO2 98%   BMI 28.73 kg/m       GEN:  NAD  NECK: No JVD  C/V:  Regular rate and rhythm, no murmur, rub or gallop.  RESP: Clear to auscultation bilaterally without wheezing, rales, or rhonchi.  GI: Abdomen soft, nontender, nondistended. No HSM appreciated.   EXTREM: No pitting LE edema.   NEURO: Alert and oriented, cooperative. No obvious focal deficits.   PSYCH: Normal affect.  SKIN: Warm and dry.       Recent Lab Results:  LIPID RESULTS:  Lab Results   Component Value Date    CHOL 120 07/12/2021    HDL 43 " 07/12/2021    LDL 59 07/12/2021     (H) 04/04/2021    TRIG 91 07/12/2021       LIVER ENZYME RESULTS:  Lab Results   Component Value Date    AST 44 04/07/2021    ALT 29 04/07/2021       CBC RESULTS:  Lab Results   Component Value Date    WBC 5.5 05/03/2021    RBC 4.05 05/03/2021    HGB 12.2 05/03/2021    HCT 37.8 05/03/2021    MCV 93 05/03/2021    MCH 30.1 05/03/2021    MCHC 32.3 05/03/2021    RDW 13.8 05/03/2021     05/03/2021       BMP RESULTS:  Lab Results   Component Value Date     07/12/2021    POTASSIUM 4.6 07/12/2021    CHLORIDE 102 07/12/2021    CO2 22 07/12/2021    ANIONGAP 5 05/03/2021     (A) 07/12/2021    BUN 9 07/12/2021    CR 0.79 07/12/2021    GFRESTIMATED 87 05/03/2021    GFRESTBLACK >90 05/03/2021    LEYLA 9.5 07/12/2021        A1C RESULTS:  Lab Results   Component Value Date    A1C 10.9 (H) 04/04/2021       INR RESULTS:  Lab Results   Component Value Date    INR 0.98 05/03/2021    INR 1.19 (H) 04/07/2021           Tiago Brewster PA-C  March 17, 2022

## 2022-05-24 ENCOUNTER — TELEPHONE (OUTPATIENT)
Dept: CARDIOLOGY | Facility: CLINIC | Age: 62
End: 2022-05-24
Payer: COMMERCIAL

## 2022-05-24 NOTE — TELEPHONE ENCOUNTER
[facility] Cardiology Refill Guideline reviewed.  Medication meets criteria for refill. Pt due for AISHWARYA f/up and BMP, message to scheduling to arrange. Pt stent anniversary is 5/3/22, will message Aybike to determine if pt is to continue/discontinue her brillinta.

## 2022-05-25 ENCOUNTER — TELEPHONE (OUTPATIENT)
Dept: CARDIOLOGY | Facility: CLINIC | Age: 62
End: 2022-05-25
Payer: COMMERCIAL

## 2022-05-25 NOTE — TELEPHONE ENCOUNTER
[facility] Call Center    Phone Message    May a detailed message be left on voicemail: yes     Reason for Call: Other: Nighat is going out of town 6/12/22- week of July 7th. She is wondering  if she can do the labs at the clinic near her and and have Aybike let her know about the medications or will she need to f/u when she returns. There are no appts between now and then. Thank you       Action Taken: Other: Cardio    Travel Screening: Not Applicable

## 2022-05-25 NOTE — TELEPHONE ENCOUNTER
Tiago Brewster PA-C Hiljus, Audrey G RN; YOSEF Licea [facility] Heart Team 2  Caller: Unspecified (Yesterday,  4:01 PM)  OK to stop brilinta.     Tiago       Called patient to review recommendation to stop brillinta at this time and to schedule over AISHWARYA OV/labs that were due in April, left message to call back.     Addendum 1300: Spoke to patient who reports no chest pain or other heart symptoms. She will finish out her current bottle of brillinta and then stop it. She will call to schedule her lab appointment and follow up with Tiago (will do the labs now and then see Tiago after she gets back from travelling 7/7/22). Scheduling phone number provided.

## 2022-07-10 ENCOUNTER — HEALTH MAINTENANCE LETTER (OUTPATIENT)
Age: 62
End: 2022-07-10

## 2022-08-11 ENCOUNTER — OFFICE VISIT (OUTPATIENT)
Dept: CARDIOLOGY | Facility: CLINIC | Age: 62
End: 2022-08-11

## 2022-08-11 ENCOUNTER — LAB (OUTPATIENT)
Dept: LAB | Facility: CLINIC | Age: 62
End: 2022-08-11
Payer: COMMERCIAL

## 2022-08-11 VITALS
HEART RATE: 66 BPM | DIASTOLIC BLOOD PRESSURE: 73 MMHG | SYSTOLIC BLOOD PRESSURE: 112 MMHG | HEIGHT: 66 IN | OXYGEN SATURATION: 99 % | BODY MASS INDEX: 28.48 KG/M2 | WEIGHT: 177.2 LBS

## 2022-08-11 DIAGNOSIS — I50.20 HEART FAILURE WITH REDUCED EJECTION FRACTION, NYHA CLASS I (H): ICD-10-CM

## 2022-08-11 DIAGNOSIS — I10 BENIGN ESSENTIAL HYPERTENSION: ICD-10-CM

## 2022-08-11 DIAGNOSIS — E78.5 HYPERLIPIDEMIA LDL GOAL <70: ICD-10-CM

## 2022-08-11 DIAGNOSIS — I25.5 ISCHEMIC CARDIOMYOPATHY: ICD-10-CM

## 2022-08-11 DIAGNOSIS — I25.10 CORONARY ARTERY DISEASE INVOLVING NATIVE CORONARY ARTERY OF NATIVE HEART WITHOUT ANGINA PECTORIS: ICD-10-CM

## 2022-08-11 DIAGNOSIS — I10 ESSENTIAL HYPERTENSION: Chronic | ICD-10-CM

## 2022-08-11 LAB
ANION GAP SERPL CALCULATED.3IONS-SCNC: 3 MMOL/L (ref 3–14)
BUN SERPL-MCNC: 12 MG/DL (ref 7–30)
CALCIUM SERPL-MCNC: 8.9 MG/DL (ref 8.5–10.1)
CHLORIDE BLD-SCNC: 107 MMOL/L (ref 94–109)
CO2 SERPL-SCNC: 28 MMOL/L (ref 20–32)
CREAT SERPL-MCNC: 0.93 MG/DL (ref 0.52–1.04)
GFR SERPL CREATININE-BSD FRML MDRD: 68 ML/MIN/1.73M2
GLUCOSE BLD-MCNC: 141 MG/DL (ref 70–99)
POTASSIUM BLD-SCNC: 4.3 MMOL/L (ref 3.4–5.3)
SODIUM SERPL-SCNC: 138 MMOL/L (ref 133–144)

## 2022-08-11 PROCEDURE — 36415 COLL VENOUS BLD VENIPUNCTURE: CPT | Performed by: PHYSICIAN ASSISTANT

## 2022-08-11 PROCEDURE — 99214 OFFICE O/P EST MOD 30 MIN: CPT | Performed by: PHYSICIAN ASSISTANT

## 2022-08-11 PROCEDURE — 80048 BASIC METABOLIC PNL TOTAL CA: CPT | Performed by: PHYSICIAN ASSISTANT

## 2022-08-11 NOTE — PROGRESS NOTES
Primary Cardiologist: Dr. Sandoval    Reason for Visit: 6 month follow up after increase     History of Present Illness:   Nighat is a 62-year-old female with past medical history notable for history of admission in 4/2021 with anterior STEMI (status post PCI of mid LAD that had thrombotic occlusion; she had residual high-grade RCA disease which was intervened upon on 5/3/2021, receiving 1 HASEEB to RPDA and 1 HASEEB to mid RCA; on aspirin, hypertension, hyperlipidemia, ischemic cardiomyopathy (NYHA class II, cardiac MRI during her admission in 4/2021 showed LVEF 44%; she is on metoprolol succinate 50 mg daily, spironolactone 25 mg, losartan 50 mg daily, and Jardiance), and T2DM (on insulin as well; hA1c improved ~10% to 7.0%).    Repeat echocardiogram in 11/2021 showed LVEF of 40%.     During out last visit we increased losartan from 25 mg to 50 mg for treatment of BP and ischemic cardiomyopathy. She returns today with repeat BMP. She reports feeling well. She denies SOB, PND, peripheral edema, abdominal distension, CP, palpitations, bleeding issues, or syncope. Her BP is much lower on the higher dose of losartan. She was started on Jardiance and is tolerating this well with significant improvement in her hemoglobin A1c.     Assessment and Plan:  Nighat is a 62-year-old female with past medical history notable for history of admission in 4/2021 with anterior STEMI (status post PCI of mid LAD that had thrombotic occlusion; she had residual high-grade RCA disease which was intervened upon on 5/3/2021, receiving 1 HASEEB to RPDA and 1 HASEEB to mid RCA; on aspirin, hypertension, hyperlipidemia, ischemic cardiomyopathy (NYHA class II, cardiac MRI during her admission in 4/2021 showed LVEF 44%; she is on metoprolol succinate 50 mg daily, spironolactone 25 mg, losartan 50 mg daily, and Jardiance), and T2DM (on insulin as well; hA1c improved ~10% to 7.0%).    Nighat is doing well from a cardiac standpoint. She has no symptoms to suggest heart  failure, ischemia, or arrhythmia. We will continue with current medications with no dose changes. She will return in 1 year with repeat limited echo. She will contact us in the interim if she has any of the above symptoms.       This note was completed in part using Dragon voice recognition software. Although reviewed after completion, some word and grammatical errors may occur.    Orders this Visit:  No orders of the defined types were placed in this encounter.    No orders of the defined types were placed in this encounter.    There are no discontinued medications.      Encounter Diagnoses   Name Primary?     Coronary artery disease involving native coronary artery of native heart without angina pectoris      Ischemic cardiomyopathy      Benign essential hypertension      Hyperlipidemia LDL goal <70      Heart failure with reduced ejection fraction, NYHA class I (H)      Essential hypertension        CURRENT MEDICATIONS:  Current Outpatient Medications   Medication Sig Dispense Refill     aspirin (ASA) 81 MG EC tablet Take 1 tablet (81 mg) by mouth daily 90 tablet 3     atorvastatin (LIPITOR) 40 MG tablet Take 1 tablet (40 mg) by mouth every evening 90 tablet 3     insulin glargine (BASAGLAR KWIKPEN) 100 UNIT/ML pen Inject 18 Units Subcutaneous At Bedtime       losartan (COZAAR) 25 MG tablet Take 1 tablet (25 mg) by mouth every evening 90 tablet 0     losartan (COZAAR) 50 MG tablet Take 1 tablet (50 mg) by mouth every evening 90 tablet 3     metFORMIN (GLUCOPHAGE) 1000 MG tablet Take 1,000 mg by mouth 2 times daily (with meals)       metoprolol succinate ER (TOPROL-XL) 50 MG 24 hr tablet Take 1 tablet (50 mg) by mouth daily 90 tablet 0     nitroGLYcerin (NITROSTAT) 0.4 MG sublingual tablet For chest pain place 1 tablet under the tongue every 5 minutes for 3 doses. If symptoms persist 5 minutes after 1st dose call 911. 30 tablet 0     omeprazole (PRILOSEC) 20 MG DR capsule Take 1 capsule (20 mg) by mouth daily 90  capsule 3     sitagliptin (JANUVIA) 100 MG tablet Take 100 mg by mouth daily       spironolactone (ALDACTONE) 25 MG tablet Take half tablet (12.5 mg) every morning. 30 tablet 3       ALLERGIES     Allergies   Allergen Reactions     Lisinopril        PAST MEDICAL HISTORY:  Past Medical History:   Diagnosis Date     Diabetes mellitus, type II (H) 04/14/2006    Uncontrolled     Essential hypertension 04/14/2006     Hyperlipidemia 04/14/2006     Ischemic cardiomyopathy      Patent ductus arteriosus     s/p surgical repair     ST elevation MI (STEMI) (H) 04/04/2021    s/p HASEEB to mid LAD     ST elevation myocardial infarction involving left anterior descending (LAD) coronary artery (H) 04/04/2021     Tobacco use        PAST SURGICAL HISTORY:  Past Surgical History:   Procedure Laterality Date     CV CORONARY ANGIOGRAM N/A 4/4/2021    Procedure: Coronary Angiogram;  Surgeon: Yusuf Maldonado MD;  Location: [facility] CARDIAC CATH LAB     CV HEART CATHETERIZATION WITH POSSIBLE INTERVENTION N/A 5/3/2021    Procedure: Heart Catheterization with Possible Intervention;  Surgeon: Alli Sandoval MD;  Location: [facility] CARDIAC CATH LAB     CV PCI STENT DRUG ELUTING N/A 4/4/2021    Procedure: Percutaneous Coronary Intervention Stent Drug Eluting;  Surgeon: Yusuf Maldonado MD;  Location: [facility] CARDIAC CATH LAB       FAMILY HISTORY:  Family History   Problem Relation Age of Onset     No Known Problems Mother      No Known Problems Father      No Known Problems Brother      No Known Problems Sister      Hypertension Son        SOCIAL HISTORY:  Social History     Socioeconomic History     Marital status:    Tobacco Use     Smoking status: Never Smoker     Smokeless tobacco: Never Used   Substance and Sexual Activity     Alcohol use: Yes     Comment: limited       Review of Systems:  Skin:        Eyes:       ENT:       Respiratory:       Cardiovascular:       Gastroenterology:      Genitourinary:        Musculoskeletal:       Neurologic:       Psychiatric:       Heme/Lymph/Imm:       Endocrine:         Physical Exam:  Vitals: There were no vitals taken for this visit.     GEN:  NAD  NECK: No JVD  C/V:  Regular rate and rhythm, no murmur, rub or gallop.  RESP: Clear to auscultation bilaterally without wheezing, rales, or rhonchi.  GI: Abdomen soft, nontender, nondistended. No HSM appreciated.   EXTREM: No pitting LE edema.   NEURO: Alert and oriented, cooperative. No obvious focal deficits.   PSYCH: Normal affect.  SKIN: Warm and dry.       Recent Lab Results:  LIPID RESULTS:  Lab Results   Component Value Date    CHOL 120 07/12/2021    HDL 43 07/12/2021    LDL 59 07/12/2021     (H) 04/04/2021    TRIG 91 07/12/2021       LIVER ENZYME RESULTS:  Lab Results   Component Value Date    AST 44 04/07/2021    ALT 29 04/07/2021       CBC RESULTS:  Lab Results   Component Value Date    WBC 5.5 05/03/2021    RBC 4.05 05/03/2021    HGB 12.2 05/03/2021    HCT 37.8 05/03/2021    MCV 93 05/03/2021    MCH 30.1 05/03/2021    MCHC 32.3 05/03/2021    RDW 13.8 05/03/2021     05/03/2021       BMP RESULTS:  Lab Results   Component Value Date     08/11/2022     07/12/2021    POTASSIUM 4.3 08/11/2022    POTASSIUM 4.6 07/12/2021    CHLORIDE 107 08/11/2022    CHLORIDE 102 07/12/2021    CO2 28 08/11/2022    CO2 22 07/12/2021    ANIONGAP 3 08/11/2022    ANIONGAP 5 05/03/2021     (H) 08/11/2022     (A) 07/12/2021    BUN 12 08/11/2022    BUN 9 07/12/2021    CR 0.93 08/11/2022    CR 0.79 07/12/2021    GFRESTIMATED 68 08/11/2022    GFRESTIMATED 87 05/03/2021    GFRESTBLACK >90 05/03/2021    LEYLA 8.9 08/11/2022    LEYLA 9.5 07/12/2021        A1C RESULTS:  Lab Results   Component Value Date    A1C 10.9 (H) 04/04/2021       INR RESULTS:  Lab Results   Component Value Date    INR 0.98 05/03/2021    INR 1.19 (H) 04/07/2021           Tiago Brewster PA-C  August 11, 2022

## 2022-08-11 NOTE — LETTER
8/11/2022    Cady Lay MD  [facility] [address]  [facility] [address]    RE: Nighat Reyes       Dear Colleague,     I had the pleasure of seeing Nighat Reyes in the [facility].  Primary Cardiologist: Dr. Sandoval    Reason for Visit: 6 month follow up after increase     History of Present Illness:   Nighat is a 62-year-old female with past medical history notable for history of admission in 4/2021 with anterior STEMI (status post PCI of mid LAD that had thrombotic occlusion; she had residual high-grade RCA disease which was intervened upon on 5/3/2021, receiving 1 HASEEB to RPDA and 1 HASEEB to mid RCA; on aspirin, hypertension, hyperlipidemia, ischemic cardiomyopathy (NYHA class II, cardiac MRI during her admission in 4/2021 showed LVEF 44%; she is on metoprolol succinate 50 mg daily, spironolactone 25 mg, losartan 50 mg daily, and Jardiance), and T2DM (on insulin as well; hA1c improved ~10% to 7.0%).    Repeat echocardiogram in 11/2021 showed LVEF of 40%.     During out last visit we increased losartan from 25 mg to 50 mg for treatment of BP and ischemic cardiomyopathy. She returns today with repeat BMP. She reports feeling well. She denies SOB, PND, peripheral edema, abdominal distension, CP, palpitations, bleeding issues, or syncope. Her BP is much lower on the higher dose of losartan. She was started on Jardiance and is tolerating this well with significant improvement in her hemoglobin A1c.     Assessment and Plan:  Nighat is a 62-year-old female with past medical history notable for history of admission in 4/2021 with anterior STEMI (status post PCI of mid LAD that had thrombotic occlusion; she had residual high-grade RCA disease which was intervened upon on 5/3/2021, receiving 1 HASEEB to RPDA and 1 HASEEB to mid RCA; on aspirin, hypertension, hyperlipidemia, ischemic cardiomyopathy (NYHA class II, cardiac MRI during her admission in 4/2021 showed LVEF 44%; she is on metoprolol  succinate 50 mg daily, spironolactone 25 mg, losartan 50 mg daily, and Jardiance), and T2DM (on insulin as well; hA1c improved ~10% to 7.0%).    Nighat is doing well from a cardiac standpoint. She has no symptoms to suggest heart failure, ischemia, or arrhythmia. We will continue with current medications with no dose changes. She will return in 1 year with repeat limited echo. She will contact us in the interim if she has any of the above symptoms.       This note was completed in part using Dragon voice recognition software. Although reviewed after completion, some word and grammatical errors may occur.    Orders this Visit:  No orders of the defined types were placed in this encounter.    No orders of the defined types were placed in this encounter.    There are no discontinued medications.      Encounter Diagnoses   Name Primary?     Coronary artery disease involving native coronary artery of native heart without angina pectoris      Ischemic cardiomyopathy      Benign essential hypertension      Hyperlipidemia LDL goal <70      Heart failure with reduced ejection fraction, NYHA class I (H)      Essential hypertension        CURRENT MEDICATIONS:  Current Outpatient Medications   Medication Sig Dispense Refill     aspirin (ASA) 81 MG EC tablet Take 1 tablet (81 mg) by mouth daily 90 tablet 3     atorvastatin (LIPITOR) 40 MG tablet Take 1 tablet (40 mg) by mouth every evening 90 tablet 3     insulin glargine (BASAGLAR KWIKPEN) 100 UNIT/ML pen Inject 18 Units Subcutaneous At Bedtime       losartan (COZAAR) 25 MG tablet Take 1 tablet (25 mg) by mouth every evening 90 tablet 0     losartan (COZAAR) 50 MG tablet Take 1 tablet (50 mg) by mouth every evening 90 tablet 3     metFORMIN (GLUCOPHAGE) 1000 MG tablet Take 1,000 mg by mouth 2 times daily (with meals)       metoprolol succinate ER (TOPROL-XL) 50 MG 24 hr tablet Take 1 tablet (50 mg) by mouth daily 90 tablet 0     nitroGLYcerin (NITROSTAT) 0.4 MG sublingual tablet  For chest pain place 1 tablet under the tongue every 5 minutes for 3 doses. If symptoms persist 5 minutes after 1st dose call 911. 30 tablet 0     omeprazole (PRILOSEC) 20 MG DR capsule Take 1 capsule (20 mg) by mouth daily 90 capsule 3     sitagliptin (JANUVIA) 100 MG tablet Take 100 mg by mouth daily       spironolactone (ALDACTONE) 25 MG tablet Take half tablet (12.5 mg) every morning. 30 tablet 3       ALLERGIES     Allergies   Allergen Reactions     Lisinopril        PAST MEDICAL HISTORY:  Past Medical History:   Diagnosis Date     Diabetes mellitus, type II (H) 04/14/2006    Uncontrolled     Essential hypertension 04/14/2006     Hyperlipidemia 04/14/2006     Ischemic cardiomyopathy      Patent ductus arteriosus     s/p surgical repair     ST elevation MI (STEMI) (H) 04/04/2021    s/p HASEEB to mid LAD     ST elevation myocardial infarction involving left anterior descending (LAD) coronary artery (H) 04/04/2021     Tobacco use        PAST SURGICAL HISTORY:  Past Surgical History:   Procedure Laterality Date     CV CORONARY ANGIOGRAM N/A 4/4/2021    Procedure: Coronary Angiogram;  Surgeon: Yusuf Maldonado MD;  Location: [facility] CARDIAC CATH LAB     CV HEART CATHETERIZATION WITH POSSIBLE INTERVENTION N/A 5/3/2021    Procedure: Heart Catheterization with Possible Intervention;  Surgeon: Alli Sandoval MD;  Location: [facility] CARDIAC CATH LAB     CV PCI STENT DRUG ELUTING N/A 4/4/2021    Procedure: Percutaneous Coronary Intervention Stent Drug Eluting;  Surgeon: Yusuf Maldonado MD;  Location: [facility] CARDIAC CATH LAB       FAMILY HISTORY:  Family History   Problem Relation Age of Onset     No Known Problems Mother      No Known Problems Father      No Known Problems Brother      No Known Problems Sister      Hypertension Son        SOCIAL HISTORY:  Social History     Socioeconomic History     Marital status:    Tobacco Use     Smoking status: Never Smoker     Smokeless tobacco: Never Used    Substance and Sexual Activity     Alcohol use: Yes     Comment: limited       Review of Systems:  Skin:        Eyes:       ENT:       Respiratory:       Cardiovascular:       Gastroenterology:      Genitourinary:       Musculoskeletal:       Neurologic:       Psychiatric:       Heme/Lymph/Imm:       Endocrine:         Physical Exam:  Vitals: There were no vitals taken for this visit.     GEN:  NAD  NECK: No JVD  C/V:  Regular rate and rhythm, no murmur, rub or gallop.  RESP: Clear to auscultation bilaterally without wheezing, rales, or rhonchi.  GI: Abdomen soft, nontender, nondistended. No HSM appreciated.   EXTREM: No pitting LE edema.   NEURO: Alert and oriented, cooperative. No obvious focal deficits.   PSYCH: Normal affect.  SKIN: Warm and dry.       Recent Lab Results:  LIPID RESULTS:  Lab Results   Component Value Date    CHOL 120 07/12/2021    HDL 43 07/12/2021    LDL 59 07/12/2021     (H) 04/04/2021    TRIG 91 07/12/2021       LIVER ENZYME RESULTS:  Lab Results   Component Value Date    AST 44 04/07/2021    ALT 29 04/07/2021       CBC RESULTS:  Lab Results   Component Value Date    WBC 5.5 05/03/2021    RBC 4.05 05/03/2021    HGB 12.2 05/03/2021    HCT 37.8 05/03/2021    MCV 93 05/03/2021    MCH 30.1 05/03/2021    MCHC 32.3 05/03/2021    RDW 13.8 05/03/2021     05/03/2021       BMP RESULTS:  Lab Results   Component Value Date     08/11/2022     07/12/2021    POTASSIUM 4.3 08/11/2022    POTASSIUM 4.6 07/12/2021    CHLORIDE 107 08/11/2022    CHLORIDE 102 07/12/2021    CO2 28 08/11/2022    CO2 22 07/12/2021    ANIONGAP 3 08/11/2022    ANIONGAP 5 05/03/2021     (H) 08/11/2022     (A) 07/12/2021    BUN 12 08/11/2022    BUN 9 07/12/2021    CR 0.93 08/11/2022    CR 0.79 07/12/2021    GFRESTIMATED 68 08/11/2022    GFRESTIMATED 87 05/03/2021    GFRESTBLACK >90 05/03/2021    LEYLA 8.9 08/11/2022    LEYLA 9.5 07/12/2021        A1C RESULTS:  Lab Results   Component Value Date     A1C 10.9 (H) 04/04/2021       INR RESULTS:  Lab Results   Component Value Date    INR 0.98 05/03/2021    INR 1.19 (H) 04/07/2021           Tiago Brewster PA-C  August 11, 2022     Thank you for allowing me to participate in the care of your patient.      Sincerely,     Tiago Brewster PA-C     [facility]  cc:   Tiago Brewster PA-C  [address] [place] [address]

## 2022-09-11 ENCOUNTER — HEALTH MAINTENANCE LETTER (OUTPATIENT)
Age: 62
End: 2022-09-11

## 2023-01-22 ENCOUNTER — HEALTH MAINTENANCE LETTER (OUTPATIENT)
Age: 63
End: 2023-01-22

## 2023-04-30 ENCOUNTER — HEALTH MAINTENANCE LETTER (OUTPATIENT)
Age: 63
End: 2023-04-30

## 2023-05-30 ENCOUNTER — CARE COORDINATION (OUTPATIENT)
Dept: CARDIOLOGY | Facility: CLINIC | Age: 63
End: 2023-05-30
Payer: COMMERCIAL

## 2023-05-30 DIAGNOSIS — E78.5 HYPERLIPIDEMIA LDL GOAL <70: ICD-10-CM

## 2023-05-30 DIAGNOSIS — I25.5 ISCHEMIC CARDIOMYOPATHY: Primary | ICD-10-CM

## 2023-05-30 DIAGNOSIS — I25.10 CORONARY ARTERY DISEASE INVOLVING NATIVE CORONARY ARTERY OF NATIVE HEART WITHOUT ANGINA PECTORIS: ICD-10-CM

## 2023-05-30 NOTE — PROGRESS NOTES
Elly Berkowitz, PAULO  [facility] 7  Hi,   This appt was just added on for Tiago for tomorrow 5/31  per pt for annual, doesn't look like she is due until August and Tiago wrote in her note for an echo in a year (not ordered) Do you want scheduling to try and reschedule?   Thanks, Elly WEAVER.       Pt scheduled her annual visit with Tiago through my chart. No echo order was placed so she didn't set that up. Pt not due until 8/2023. Will message scheduling to call pt to see if pt is willing to set up limited echo and reschedule her visit.Pt will also be due for annual FLP and bmp per clinic refill protocol. Kallie WILLAMS May 30, 2023, 3:03 PM

## 2023-06-21 ENCOUNTER — LAB (OUTPATIENT)
Dept: LAB | Facility: CLINIC | Age: 63
End: 2023-06-21
Payer: COMMERCIAL

## 2023-06-21 ENCOUNTER — HOSPITAL ENCOUNTER (OUTPATIENT)
Dept: CARDIOLOGY | Facility: CLINIC | Age: 63
Discharge: HOME | End: 2023-06-21
Attending: PHYSICIAN ASSISTANT | Admitting: PHYSICIAN ASSISTANT
Payer: COMMERCIAL

## 2023-06-21 DIAGNOSIS — I25.10 CORONARY ARTERY DISEASE INVOLVING NATIVE CORONARY ARTERY OF NATIVE HEART WITHOUT ANGINA PECTORIS: ICD-10-CM

## 2023-06-21 DIAGNOSIS — E78.5 HYPERLIPIDEMIA LDL GOAL <70: ICD-10-CM

## 2023-06-21 DIAGNOSIS — I25.5 ISCHEMIC CARDIOMYOPATHY: ICD-10-CM

## 2023-06-21 LAB
ALT SERPL W P-5'-P-CCNC: 20 U/L (ref 0–50)
ANION GAP SERPL CALCULATED.3IONS-SCNC: 13 MMOL/L (ref 7–15)
BUN SERPL-MCNC: 15.4 MG/DL (ref 8–23)
CALCIUM SERPL-MCNC: 8.9 MG/DL (ref 8.8–10.2)
CHLORIDE SERPL-SCNC: 101 MMOL/L (ref 98–107)
CHOLEST SERPL-MCNC: 136 MG/DL
CREAT SERPL-MCNC: 0.82 MG/DL (ref 0.51–0.95)
DEPRECATED HCO3 PLAS-SCNC: 22 MMOL/L (ref 22–29)
GFR SERPL CREATININE-BSD FRML MDRD: 79 ML/MIN/1.73M2
GLUCOSE SERPL-MCNC: 186 MG/DL (ref 70–99)
HDLC SERPL-MCNC: 34 MG/DL
LDLC SERPL CALC-MCNC: 56 MG/DL
LVEF ECHO: NORMAL
NONHDLC SERPL-MCNC: 102 MG/DL
POTASSIUM SERPL-SCNC: 4.8 MMOL/L (ref 3.4–5.3)
SODIUM SERPL-SCNC: 136 MMOL/L (ref 136–145)
TRIGL SERPL-MCNC: 231 MG/DL

## 2023-06-21 PROCEDURE — C8924 2D TTE W OR W/O FOL W/CON,FU: HCPCS

## 2023-06-21 PROCEDURE — 36415 COLL VENOUS BLD VENIPUNCTURE: CPT | Performed by: PHYSICIAN ASSISTANT

## 2023-06-21 PROCEDURE — 93308 TTE F-UP OR LMTD: CPT | Mod: 26 | Performed by: INTERNAL MEDICINE

## 2023-06-21 PROCEDURE — 999N000208 ECHOCARDIOGRAM LIMITED

## 2023-06-21 PROCEDURE — 93325 DOPPLER ECHO COLOR FLOW MAPG: CPT | Mod: 26 | Performed by: INTERNAL MEDICINE

## 2023-06-21 PROCEDURE — 80061 LIPID PANEL: CPT | Performed by: PHYSICIAN ASSISTANT

## 2023-06-21 PROCEDURE — 93321 DOPPLER ECHO F-UP/LMTD STD: CPT | Mod: 26 | Performed by: INTERNAL MEDICINE

## 2023-06-21 PROCEDURE — 255N000002 HC RX 255 OP 636: Performed by: PHYSICIAN ASSISTANT

## 2023-06-21 PROCEDURE — 80048 BASIC METABOLIC PNL TOTAL CA: CPT | Performed by: PHYSICIAN ASSISTANT

## 2023-06-21 PROCEDURE — 84460 ALANINE AMINO (ALT) (SGPT): CPT | Performed by: PHYSICIAN ASSISTANT

## 2023-06-21 RX ADMIN — HUMAN ALBUMIN MICROSPHERES AND PERFLUTREN 3 ML: 10; .22 INJECTION, SOLUTION INTRAVENOUS at 09:15

## 2023-07-20 ENCOUNTER — TELEPHONE (OUTPATIENT)
Dept: CARDIOLOGY | Facility: CLINIC | Age: 63
End: 2023-07-20

## 2023-07-20 ENCOUNTER — OFFICE VISIT (OUTPATIENT)
Dept: CARDIOLOGY | Facility: CLINIC | Age: 63
End: 2023-07-20
Payer: COMMERCIAL

## 2023-07-20 VITALS
OXYGEN SATURATION: 96 % | BODY MASS INDEX: 28.45 KG/M2 | HEIGHT: 66 IN | SYSTOLIC BLOOD PRESSURE: 104 MMHG | HEART RATE: 82 BPM | DIASTOLIC BLOOD PRESSURE: 64 MMHG | WEIGHT: 177 LBS

## 2023-07-20 DIAGNOSIS — I21.02 ST ELEVATION MYOCARDIAL INFARCTION INVOLVING LEFT ANTERIOR DESCENDING (LAD) CORONARY ARTERY (H): ICD-10-CM

## 2023-07-20 DIAGNOSIS — I25.10 CORONARY ARTERY DISEASE INVOLVING NATIVE HEART: ICD-10-CM

## 2023-07-20 DIAGNOSIS — I25.118 CORONARY ARTERY DISEASE OF NATIVE ARTERY OF NATIVE HEART WITH STABLE ANGINA PECTORIS (H): ICD-10-CM

## 2023-07-20 DIAGNOSIS — E11.9 DIABETES MELLITUS, TYPE II (H): Chronic | ICD-10-CM

## 2023-07-20 DIAGNOSIS — I21.02 ST ELEVATION MYOCARDIAL INFARCTION INVOLVING LEFT ANTERIOR DESCENDING (LAD) CORONARY ARTERY (H): Primary | ICD-10-CM

## 2023-07-20 PROCEDURE — 99214 OFFICE O/P EST MOD 30 MIN: CPT | Performed by: PHYSICIAN ASSISTANT

## 2023-07-20 RX ORDER — NITROGLYCERIN 0.4 MG/1
TABLET SUBLINGUAL
Qty: 30 TABLET | Refills: 3 | Status: SHIPPED | OUTPATIENT
Start: 2023-07-20

## 2023-07-20 NOTE — TELEPHONE ENCOUNTER
Patient has Medicare Advantage through [employer] sponsored by an employer.    Entresto: $40/mo.    Tabatha Marques  Pharmacy Technician/Liaison, Discharge Pharmacy   [phone] (voice or text)  [email or web address]

## 2023-07-20 NOTE — LETTER
7/20/2023    Cady Lay MD  [address]  [facility] [address]    RE: Nighat BARON Amy       Dear Colleague,     I had the pleasure of seeing Nighat Reyes in the [facility].  Primary Cardiologist: Dr. Sandoval    Reason for Visit: Annual follow up with repeat echocardiogram    History of Present Illness:   Nighat is a 62-year-old female with past medical history notable for history of admission in 4/2021 with anterior STEMI (status post PCI of mid LAD that had thrombotic occlusion; she had residual high-grade RCA disease which was intervened upon on 5/3/2021, receiving 1 HASEEB to RPDA and 1 HASEEB to mid RCA; on aspirin, hypertension, hyperlipidemia, ischemic cardiomyopathy (NYHA class II, cardiac MRI during her admission in 4/2021 showed LVEF 44%; she is on metoprolol succinate 50 mg daily, spironolactone 25 mg, losartan 50 mg daily, and Jardiance), and T2DM (on insulin as well; hA1c had improved ~10% to 7.0% but now up to ~9%).    Nighat presents to clinic today stating she is doing well.  She denies shortness of breath, orthopnea, peripheral edema, PND, syncope, or bleeding issues.  She can get occasional peripheral edema of her right lower extremity but she had orthopedic injury to that leg recently and she feels this may be due to that.  It tends to be okay in the morning but will get worse towards the end of the day or on a very hot day.    Assessment and Plan:  Nighat is a 62-year-old female with past medical history notable for history of admission in 4/2021 with anterior STEMI (status post PCI of mid LAD that had thrombotic occlusion; she had residual high-grade RCA disease which was intervened upon on 5/3/2021, receiving 1 HASEEB to RPDA and 1 HASEEB to mid RCA; on aspirin, hypertension, hyperlipidemia, ischemic cardiomyopathy (NYHA class II, cardiac MRI during her admission in 4/2021 showed LVEF 44%; she is on metoprolol succinate 50 mg daily, spironolactone 25 mg, losartan 50 mg daily, and  Jardiance), and T2DM (on insulin as well; hA1c had improved ~10% to 7.0% but now up to ~9%).    Nighat appears to be euvolemic on today's exam.  Unfortunately her echocardiogram shows no significant improvement in her LVEF.  It remains in the 35-40% range.  We will continue to optimize her cardiomyopathy medications by considering switching her from losartan to Entresto.  I will have her be enrolled in [facility] as well.  Once we know more information about costs on Entresto we will make the switch and have her come 2 weeks afterwards with repeat BMP.  She verbalized understanding.      This note was completed in part using Dragon voice recognition software. Although reviewed after completion, some word and grammatical errors may occur.    Orders this Visit:  No orders of the defined types were placed in this encounter.    No orders of the defined types were placed in this encounter.    Medications Discontinued During This Encounter   Medication Reason    losartan (COZAAR) 25 MG tablet Therapy completed (No AVS)         Encounter Diagnoses   Name Primary?    ST elevation myocardial infarction involving left anterior descending (LAD) coronary artery (H)     Coronary artery disease of native artery of native heart with stable angina pectoris (H)        CURRENT MEDICATIONS:  Current Outpatient Medications   Medication Sig Dispense Refill    aspirin (ASA) 81 MG EC tablet Take 1 tablet (81 mg) by mouth daily 90 tablet 3    atorvastatin (LIPITOR) 40 MG tablet Take 1 tablet (40 mg) by mouth every evening 90 tablet 3    empagliflozin (JARDIANCE) 10 MG TABS tablet Take 10 mg by mouth      losartan (COZAAR) 50 MG tablet Take 1 tablet (50 mg) by mouth every evening 90 tablet 3    metFORMIN (GLUCOPHAGE) 1000 MG tablet Take 1,000 mg by mouth 2 times daily (with meals)      metoprolol succinate ER (TOPROL-XL) 50 MG 24 hr tablet Take 1 tablet (50 mg) by mouth daily 90 tablet 0    nitroGLYcerin (NITROSTAT) 0.4 MG sublingual tablet  For chest pain place 1 tablet under the tongue every 5 minutes for 3 doses. If symptoms persist 5 minutes after 1st dose call 911. 30 tablet 0    omeprazole (PRILOSEC) 20 MG DR capsule Take 1 capsule (20 mg) by mouth daily 90 capsule 3    sitagliptin (JANUVIA) 100 MG tablet Take 100 mg by mouth daily      spironolactone (ALDACTONE) 25 MG tablet Take half tablet (12.5 mg) every morning. 30 tablet 3    insulin glargine (BASAGLAR KWIKPEN) 100 UNIT/ML pen Inject 18 Units Subcutaneous At Bedtime (Patient not taking: Reported on 8/11/2022)         ALLERGIES     Allergies   Allergen Reactions    Lisinopril        PAST MEDICAL HISTORY:  Past Medical History:   Diagnosis Date    Diabetes mellitus, type II (H) 04/14/2006    Uncontrolled    Essential hypertension 04/14/2006    Hyperlipidemia 04/14/2006    Ischemic cardiomyopathy     Patent ductus arteriosus     s/p surgical repair    ST elevation MI (STEMI) (H) 04/04/2021    s/p HASEEB to mid LAD    ST elevation myocardial infarction involving left anterior descending (LAD) coronary artery (H) 04/04/2021    Tobacco use        PAST SURGICAL HISTORY:  Past Surgical History:   Procedure Laterality Date    CV CORONARY ANGIOGRAM N/A 4/4/2021    Procedure: Coronary Angiogram;  Surgeon: Yusuf Maldonado MD;  Location: [facility] CARDIAC CATH LAB    CV HEART CATHETERIZATION WITH POSSIBLE INTERVENTION N/A 5/3/2021    Procedure: Heart Catheterization with Possible Intervention;  Surgeon: Alli Sandoval MD;  Location: [facility] CARDIAC CATH LAB    CV PCI STENT DRUG ELUTING N/A 4/4/2021    Procedure: Percutaneous Coronary Intervention Stent Drug Eluting;  Surgeon: Yusuf Maldonado MD;  Location: [facility] CARDIAC CATH LAB       FAMILY HISTORY:  Family History   Problem Relation Age of Onset    No Known Problems Mother     No Known Problems Father     No Known Problems Brother     No Known Problems Sister     Hypertension Son        SOCIAL HISTORY:  Social History     Socioeconomic  "History    Marital status:      Spouse name: None    Number of children: None    Years of education: None    Highest education level: None   Tobacco Use    Smoking status: Never    Smokeless tobacco: Never   Substance and Sexual Activity    Alcohol use: Yes     Comment: occ    Drug use: Not Currently       Review of Systems:  Skin:        Eyes:       ENT:       Respiratory:  Negative    Cardiovascular:    Positive for;edema;fatigue  Gastroenterology:      Genitourinary:       Musculoskeletal:       Neurologic:       Psychiatric:       Heme/Lymph/Imm:       Endocrine:         Physical Exam:  Vitals: /64 (BP Location: Right arm, Patient Position: Sitting, Cuff Size: Adult Regular)   Pulse 82   Ht 1.676 m (5' 6\")   Wt 80.3 kg (177 lb)   SpO2 96%   BMI 28.57 kg/m       GEN:  NAD  NECK: No JVD  C/V:  Regular rate and rhythm, no murmur, rub or gallop.  RESP: Clear to auscultation bilaterally.  GI: Abdomen soft, nontender, nondistended. No HSM appreciated.   EXTREM: Trace pitting Right LE edema. No pitting edema of Left LE edema.    NEURO: Alert and oriented, cooperative. No obvious focal deficits.   PSYCH: Normal affect.  SKIN: Warm and dry.       Recent Lab Results:  LIPID RESULTS:  Lab Results   Component Value Date    CHOL 136 06/21/2023    CHOL 120 07/12/2021    HDL 34 (L) 06/21/2023    HDL 43 07/12/2021    LDL 56 06/21/2023    LDL 59 07/12/2021     (H) 04/04/2021    TRIG 231 (H) 06/21/2023    TRIG 91 07/12/2021       LIVER ENZYME RESULTS:  Lab Results   Component Value Date    AST 44 04/07/2021    ALT 20 06/21/2023    ALT 29 04/07/2021       CBC RESULTS:  Lab Results   Component Value Date    WBC 5.5 05/03/2021    RBC 4.05 05/03/2021    HGB 12.2 05/03/2021    HCT 37.8 05/03/2021    MCV 93 05/03/2021    MCH 30.1 05/03/2021    MCHC 32.3 05/03/2021    RDW 13.8 05/03/2021     05/03/2021       BMP RESULTS:  Lab Results   Component Value Date     06/21/2023     07/12/2021    " POTASSIUM 4.8 06/21/2023    POTASSIUM 4.3 08/11/2022    POTASSIUM 4.6 07/12/2021    CHLORIDE 101 06/21/2023    CHLORIDE 107 08/11/2022    CHLORIDE 102 07/12/2021    CO2 22 06/21/2023    CO2 28 08/11/2022    CO2 22 07/12/2021    ANIONGAP 13 06/21/2023    ANIONGAP 3 08/11/2022    ANIONGAP 5 05/03/2021     (H) 06/21/2023     (H) 08/11/2022     (A) 07/12/2021    BUN 15.4 06/21/2023    BUN 12 08/11/2022    BUN 9 07/12/2021    CR 0.82 06/21/2023    CR 0.79 07/12/2021    GFRESTIMATED 79 06/21/2023    GFRESTIMATED 87 05/03/2021    GFRESTBLACK >90 05/03/2021    LEYLA 8.9 06/21/2023    LEYLA 9.5 07/12/2021        A1C RESULTS:  Lab Results   Component Value Date    A1C 10.9 (H) 04/04/2021       INR RESULTS:  Lab Results   Component Value Date    INR 0.98 05/03/2021    INR 1.19 (H) 04/07/2021           Tiago Brewster PA-C  July 20, 2023         Thank you for allowing me to participate in the care of your patient.      Sincerely,     Tiago Brewster PA-C     [facility]  cc:   No referring provider defined for this encounter.

## 2023-07-20 NOTE — PROGRESS NOTES
Primary Cardiologist: Dr. Sandoval    Reason for Visit: Annual follow up with repeat echocardiogram    History of Present Illness:   Nighat is a 62-year-old female with past medical history notable for history of admission in 4/2021 with anterior STEMI (status post PCI of mid LAD that had thrombotic occlusion; she had residual high-grade RCA disease which was intervened upon on 5/3/2021, receiving 1 HASEEB to RPDA and 1 HASEEB to mid RCA; on aspirin, hypertension, hyperlipidemia, ischemic cardiomyopathy (NYHA class II, cardiac MRI during her admission in 4/2021 showed LVEF 44%; she is on metoprolol succinate 50 mg daily, spironolactone 25 mg, losartan 50 mg daily, and Jardiance), and T2DM (on insulin as well; hA1c had improved ~10% to 7.0% but now up to ~9%).    Nighat presents to clinic today stating she is doing well.  She denies shortness of breath, orthopnea, peripheral edema, PND, syncope, or bleeding issues.  She can get occasional peripheral edema of her right lower extremity but she had orthopedic injury to that leg recently and she feels this may be due to that.  It tends to be okay in the morning but will get worse towards the end of the day or on a very hot day.    Assessment and Plan:  Nighat is a 62-year-old female with past medical history notable for history of admission in 4/2021 with anterior STEMI (status post PCI of mid LAD that had thrombotic occlusion; she had residual high-grade RCA disease which was intervened upon on 5/3/2021, receiving 1 HASEEB to RPDA and 1 HASEEB to mid RCA; on aspirin, hypertension, hyperlipidemia, ischemic cardiomyopathy (NYHA class II, cardiac MRI during her admission in 4/2021 showed LVEF 44%; she is on metoprolol succinate 50 mg daily, spironolactone 25 mg, losartan 50 mg daily, and Jardiance), and T2DM (on insulin as well; hA1c had improved ~10% to 7.0% but now up to ~9%).    Nighat appears to be euvolemic on today's exam.  Unfortunately her echocardiogram shows no significant improvement  in her LVEF.  It remains in the 35-40% range.  We will continue to optimize her cardiomyopathy medications by considering switching her from losartan to Entresto.  I will have her be enrolled in CORE clinic as well.  Once we know more information about costs on Entresto we will make the switch and have her come 2 weeks afterwards with repeat BMP.  She verbalized understanding.      This note was completed in part using Dragon voice recognition software. Although reviewed after completion, some word and grammatical errors may occur.    Orders this Visit:  No orders of the defined types were placed in this encounter.    No orders of the defined types were placed in this encounter.    Medications Discontinued During This Encounter   Medication Reason     losartan (COZAAR) 25 MG tablet Therapy completed (No AVS)         Encounter Diagnoses   Name Primary?     ST elevation myocardial infarction involving left anterior descending (LAD) coronary artery (H)      Coronary artery disease of native artery of native heart with stable angina pectoris (H)        CURRENT MEDICATIONS:  Current Outpatient Medications   Medication Sig Dispense Refill     aspirin (ASA) 81 MG EC tablet Take 1 tablet (81 mg) by mouth daily 90 tablet 3     atorvastatin (LIPITOR) 40 MG tablet Take 1 tablet (40 mg) by mouth every evening 90 tablet 3     empagliflozin (JARDIANCE) 10 MG TABS tablet Take 10 mg by mouth       losartan (COZAAR) 50 MG tablet Take 1 tablet (50 mg) by mouth every evening 90 tablet 3     metFORMIN (GLUCOPHAGE) 1000 MG tablet Take 1,000 mg by mouth 2 times daily (with meals)       metoprolol succinate ER (TOPROL-XL) 50 MG 24 hr tablet Take 1 tablet (50 mg) by mouth daily 90 tablet 0     nitroGLYcerin (NITROSTAT) 0.4 MG sublingual tablet For chest pain place 1 tablet under the tongue every 5 minutes for 3 doses. If symptoms persist 5 minutes after 1st dose call 911. 30 tablet 0     omeprazole (PRILOSEC) 20 MG DR capsule Take 1  capsule (20 mg) by mouth daily 90 capsule 3     sitagliptin (JANUVIA) 100 MG tablet Take 100 mg by mouth daily       spironolactone (ALDACTONE) 25 MG tablet Take half tablet (12.5 mg) every morning. 30 tablet 3     insulin glargine (BASAGLAR KWIKPEN) 100 UNIT/ML pen Inject 18 Units Subcutaneous At Bedtime (Patient not taking: Reported on 8/11/2022)         ALLERGIES     Allergies   Allergen Reactions     Lisinopril        PAST MEDICAL HISTORY:  Past Medical History:   Diagnosis Date     Diabetes mellitus, type II (H) 04/14/2006    Uncontrolled     Essential hypertension 04/14/2006     Hyperlipidemia 04/14/2006     Ischemic cardiomyopathy      Patent ductus arteriosus     s/p surgical repair     ST elevation MI (STEMI) (H) 04/04/2021    s/p HASEEB to mid LAD     ST elevation myocardial infarction involving left anterior descending (LAD) coronary artery (H) 04/04/2021     Tobacco use        PAST SURGICAL HISTORY:  Past Surgical History:   Procedure Laterality Date     CV CORONARY ANGIOGRAM N/A 4/4/2021    Procedure: Coronary Angiogram;  Surgeon: Yusuf Maldonado MD;  Location: [facility] CARDIAC CATH LAB     CV HEART CATHETERIZATION WITH POSSIBLE INTERVENTION N/A 5/3/2021    Procedure: Heart Catheterization with Possible Intervention;  Surgeon: Alli Sandoval MD;  Location: [facility] CARDIAC CATH LAB     CV PCI STENT DRUG ELUTING N/A 4/4/2021    Procedure: Percutaneous Coronary Intervention Stent Drug Eluting;  Surgeon: Yusuf Maldonado MD;  Location: [facility] CARDIAC CATH LAB       FAMILY HISTORY:  Family History   Problem Relation Age of Onset     No Known Problems Mother      No Known Problems Father      No Known Problems Brother      No Known Problems Sister      Hypertension Son        SOCIAL HISTORY:  Social History     Socioeconomic History     Marital status:      Spouse name: None     Number of children: None     Years of education: None     Highest education level: None   Tobacco Use      "Smoking status: Never     Smokeless tobacco: Never   Substance and Sexual Activity     Alcohol use: Yes     Comment: occ     Drug use: Not Currently       Review of Systems:  Skin:        Eyes:       ENT:       Respiratory:  Negative    Cardiovascular:    Positive for;edema;fatigue  Gastroenterology:      Genitourinary:       Musculoskeletal:       Neurologic:       Psychiatric:       Heme/Lymph/Imm:       Endocrine:         Physical Exam:  Vitals: /64 (BP Location: Right arm, Patient Position: Sitting, Cuff Size: Adult Regular)   Pulse 82   Ht 1.676 m (5' 6\")   Wt 80.3 kg (177 lb)   SpO2 96%   BMI 28.57 kg/m       GEN:  NAD  NECK: No JVD  C/V:  Regular rate and rhythm, no murmur, rub or gallop.  RESP: Clear to auscultation bilaterally.  GI: Abdomen soft, nontender, nondistended. No HSM appreciated.   EXTREM: Trace pitting Right LE edema. No pitting edema of Left LE edema.    NEURO: Alert and oriented, cooperative. No obvious focal deficits.   PSYCH: Normal affect.  SKIN: Warm and dry.       Recent Lab Results:  LIPID RESULTS:  Lab Results   Component Value Date    CHOL 136 06/21/2023    CHOL 120 07/12/2021    HDL 34 (L) 06/21/2023    HDL 43 07/12/2021    LDL 56 06/21/2023    LDL 59 07/12/2021     (H) 04/04/2021    TRIG 231 (H) 06/21/2023    TRIG 91 07/12/2021       LIVER ENZYME RESULTS:  Lab Results   Component Value Date    AST 44 04/07/2021    ALT 20 06/21/2023    ALT 29 04/07/2021       CBC RESULTS:  Lab Results   Component Value Date    WBC 5.5 05/03/2021    RBC 4.05 05/03/2021    HGB 12.2 05/03/2021    HCT 37.8 05/03/2021    MCV 93 05/03/2021    MCH 30.1 05/03/2021    MCHC 32.3 05/03/2021    RDW 13.8 05/03/2021     05/03/2021       BMP RESULTS:  Lab Results   Component Value Date     06/21/2023     07/12/2021    POTASSIUM 4.8 06/21/2023    POTASSIUM 4.3 08/11/2022    POTASSIUM 4.6 07/12/2021    CHLORIDE 101 06/21/2023    CHLORIDE 107 08/11/2022    CHLORIDE 102 07/12/2021    " CO2 22 06/21/2023    CO2 28 08/11/2022    CO2 22 07/12/2021    ANIONGAP 13 06/21/2023    ANIONGAP 3 08/11/2022    ANIONGAP 5 05/03/2021     (H) 06/21/2023     (H) 08/11/2022     (A) 07/12/2021    BUN 15.4 06/21/2023    BUN 12 08/11/2022    BUN 9 07/12/2021    CR 0.82 06/21/2023    CR 0.79 07/12/2021    GFRESTIMATED 79 06/21/2023    GFRESTIMATED 87 05/03/2021    GFRESTBLACK >90 05/03/2021    LEYLA 8.9 06/21/2023    LEYLA 9.5 07/12/2021        A1C RESULTS:  Lab Results   Component Value Date    A1C 10.9 (H) 04/04/2021       INR RESULTS:  Lab Results   Component Value Date    INR 0.98 05/03/2021    INR 1.19 (H) 04/07/2021           Tiago Brewster PA-C  July 20, 2023

## 2023-07-20 NOTE — TELEPHONE ENCOUNTER
Pt with a history of systolic heart failure..  Medication list reviewed and pt is not currently on Entresto.  Pt is on SGLT2i.     Please initiate coverage check for the above medications.    Ronda Quan RN BSN   [place], [place]  [facility]. Clinic Care Coordinator  07/20/23, 10:46 AM

## 2023-07-20 NOTE — TELEPHONE ENCOUNTER
----- Message from Tiago Brewster PA-C sent at 7/20/2023 10:32 AM CDT -----  Please see what coverage would be for entresto would be for this patient. If we can switch her then let's set her up for core enrollment with me 2 weeks after the medication changes (she is on losartan currently).     Thanks!     Tiago

## 2023-07-25 ENCOUNTER — TELEPHONE (OUTPATIENT)
Dept: CARDIOLOGY | Facility: CLINIC | Age: 63
End: 2023-07-25
Payer: COMMERCIAL

## 2023-07-25 DIAGNOSIS — I25.5 ISCHEMIC CARDIOMYOPATHY: ICD-10-CM

## 2023-07-25 DIAGNOSIS — I50.22 CHRONIC SYSTOLIC CONGESTIVE HEART FAILURE (H): Primary | ICD-10-CM

## 2023-07-25 DIAGNOSIS — R06.02 SOB (SHORTNESS OF BREATH): ICD-10-CM

## 2023-07-25 RX ORDER — SACUBITRIL AND VALSARTAN 24; 26 MG/1; MG/1
1 TABLET, FILM COATED ORAL 2 TIMES DAILY
Qty: 180 TABLET | Refills: 1 | Status: SHIPPED | OUTPATIENT
Start: 2023-07-26 | End: 2024-02-07

## 2023-07-25 NOTE — TELEPHONE ENCOUNTER
Returned call to Pt left short message informing her NP ordered for her to see core to optimize medications. Asked she call for further discussion, and left phone number. VIC Anderson RN

## 2023-07-25 NOTE — TELEPHONE ENCOUNTER
Appts made      Future Appointments   Date Time Provider Department Center   8/10/2023 12:45 PM [facility]   8/10/2023  1:30 PM Tiago Brewster PA-C [facility] PSA CLIN             DERICK EdwardsN, RN 4:05 PM 07/25/23

## 2023-07-25 NOTE — TELEPHONE ENCOUNTER
Will message NP Pt asking about entresto RX, and F/U. Will ask scheduling to move appt out to 8/10/23.VIC Anderson RN

## 2023-07-25 NOTE — TELEPHONE ENCOUNTER
Lab orders placed per CORE recommendations.        Called Nighat to see if cost of Entresto is affordable. No answer. Left voice mail for a call back to discuss price.               Hold placed on 8/1 1245 PM lab. Will confirm time with her after she returns call.           Future Appointments   Date Time Provider Department Center   8/1/2023  1:30 [facility]         MARYANA Edwards, RN 2:43 PM 07/25/23

## 2023-07-25 NOTE — TELEPHONE ENCOUNTER
Prescription sent for Entresto 24 - 26 mg tablets: Take 1 tablet twice daily.    Removed Losartan from medication list.         DERICK EdwardsN, RN 3:09 PM 07/25/23

## 2023-07-25 NOTE — TELEPHONE ENCOUNTER
Nighat returned call. We discussed cost of Entresto - it is affordable. She julio prescription called into [facility] in [place].   She will  Entresto tomorrow, 7/26. She takes Losartan in the evening. Last dose will be 7/24. Tomorrow evening she will take her first dose of Entresto.     She would like to r/s 8/1 CORE enrollment appt to 8/10.  Hold placed on 1230 PM labs and 130 PM with Tiago.   Discussed which labs will be checked and rationale.   She expressed understanding.     Update to Tiago and what dose of Entresto.      Messaged scheduling to:     Reschedule CORE Enrollment appt with Tiago from 8/1 to 8/10 at 130 PM  Lab appt 8/10 at 1245 for BMP, TSH with Free T4,  HGB and NT pro BNP      MARYANA Edwards, RN 2:58 PM 07/25/23

## 2023-10-07 ENCOUNTER — HEALTH MAINTENANCE LETTER (OUTPATIENT)
Age: 63
End: 2023-10-07

## 2023-12-16 ENCOUNTER — HEALTH MAINTENANCE LETTER (OUTPATIENT)
Age: 63
End: 2023-12-16

## 2024-02-04 ENCOUNTER — MYC REFILL (OUTPATIENT)
Dept: CARDIOLOGY | Facility: CLINIC | Age: 64
End: 2024-02-04
Payer: COMMERCIAL

## 2024-02-04 DIAGNOSIS — I50.22 CHRONIC SYSTOLIC CONGESTIVE HEART FAILURE (H): ICD-10-CM

## 2024-02-04 RX ORDER — SACUBITRIL AND VALSARTAN 24; 26 MG/1; MG/1
1 TABLET, FILM COATED ORAL 2 TIMES DAILY
Qty: 180 TABLET | Refills: 1 | Status: CANCELLED | OUTPATIENT
Start: 2024-02-04

## 2024-02-05 NOTE — TELEPHONE ENCOUNTER
[facility]    Chart reviewed:   -- 720/23:  LOV w/ Tiago Good for general return follow up. Euvolemic.  No change to EF, remains 35-40%.  Plan:  Enroll in CORE Clinic.  Consider switching to Entresto (from Losartan) once we determine if cost affordable.  RTC in 2 weeks w/ repeat BMP if pt able to start Entresto.   -- 7/25/23:  Entresto cost affordable for pt.  Orders placed, Rx sent for Entresto 24-26 mg BID.  Losartan removed from med list.  Appts scheduled for repeat las (BMP, BNP, TSH/T4, Hgb) on 8/10/24 w/ CORE Enrollment appt.   -- 8/10/23:  No showed to appts.  Max attempts made at reaching pt.  Pt was never rescheduled.     No future appointments.    Attempt #1:  Called pt to discuss need for repeat labs and CORE enrollment OV.  VM left requesting call back.  Informed pt will not order Entresto refills until pt calls back to arrange appts.  Need to be sure BMP stable before sending any further refills as she was due for labs 8/10/23.       Ronda Quan RN BSN   [facility] [place], [place]  [facility] Care Coordinator  02/05/24, 11:44 AM

## 2024-02-06 NOTE — TELEPHONE ENCOUNTER
[facility]    New orders placed for Hgb, BNP, TSH/T4, BMP and CORE Enrollment per Tiago Good.     Removed old orders from chart as scheduling not able to use.     Future Appointments   Date Time Provider Department Center   3/1/2024 12:30 PM RU LAB [facility]   3/1/2024  1:30 PM [facility]     Ronda Quan RN BSN   [facility] [place], [place]  [facility] Clinic Care Coordinator  02/06/24, 4:00 PM

## 2024-02-07 ENCOUNTER — LAB (OUTPATIENT)
Dept: LAB | Facility: CLINIC | Age: 64
End: 2024-02-07
Payer: COMMERCIAL

## 2024-02-07 DIAGNOSIS — I50.22 CHRONIC SYSTOLIC CONGESTIVE HEART FAILURE (H): ICD-10-CM

## 2024-02-07 LAB
ANION GAP SERPL CALCULATED.3IONS-SCNC: 12 MMOL/L (ref 7–15)
BUN SERPL-MCNC: 18.7 MG/DL (ref 8–23)
CALCIUM SERPL-MCNC: 9 MG/DL (ref 8.8–10.2)
CHLORIDE SERPL-SCNC: 104 MMOL/L (ref 98–107)
CREAT SERPL-MCNC: 0.79 MG/DL (ref 0.51–0.95)
DEPRECATED HCO3 PLAS-SCNC: 22 MMOL/L (ref 22–29)
EGFRCR SERPLBLD CKD-EPI 2021: 82 ML/MIN/1.73M2
GLUCOSE SERPL-MCNC: 337 MG/DL (ref 70–99)
HGB BLD-MCNC: 12 G/DL (ref 11.7–15.7)
NT-PROBNP SERPL-MCNC: 911 PG/ML (ref 0–900)
POTASSIUM SERPL-SCNC: 4.5 MMOL/L (ref 3.4–5.3)
SODIUM SERPL-SCNC: 138 MMOL/L (ref 135–145)
TSH SERPL DL<=0.005 MIU/L-ACNC: 2.61 UIU/ML (ref 0.3–4.2)

## 2024-02-07 PROCEDURE — 84443 ASSAY THYROID STIM HORMONE: CPT | Performed by: PHYSICIAN ASSISTANT

## 2024-02-07 PROCEDURE — 80048 BASIC METABOLIC PNL TOTAL CA: CPT | Performed by: PHYSICIAN ASSISTANT

## 2024-02-07 PROCEDURE — 83880 ASSAY OF NATRIURETIC PEPTIDE: CPT | Performed by: PHYSICIAN ASSISTANT

## 2024-02-07 PROCEDURE — 85018 HEMOGLOBIN: CPT | Performed by: PHYSICIAN ASSISTANT

## 2024-02-07 PROCEDURE — 36415 COLL VENOUS BLD VENIPUNCTURE: CPT | Performed by: PHYSICIAN ASSISTANT

## 2024-02-07 RX ORDER — SACUBITRIL AND VALSARTAN 24; 26 MG/1; MG/1
1 TABLET, FILM COATED ORAL 2 TIMES DAILY
Qty: 180 TABLET | Refills: 0 | Status: SHIPPED | OUTPATIENT
Start: 2024-02-07 | End: 2024-05-10

## 2024-02-07 NOTE — TELEPHONE ENCOUNTER
[facility]    Pt had labs drawn today as pt overdue for lab work after starting Entresto on 7/25/24.  Pt is also scheduled for follow up w/ Luís Good at next available appt.      Component      Latest Ref Rng 2/7/2024  9:44 AM   Sodium      135 - 145 mmol/L 138    Potassium      3.4 - 5.3 mmol/L 4.5    Chloride      98 - 107 mmol/L 104    Carbon Dioxide (CO2)      22 - 29 mmol/L 22    Anion Gap      7 - 15 mmol/L 12    Urea Nitrogen      8.0 - 23.0 mg/dL 18.7    Creatinine      0.51 - 0.95 mg/dL 0.79    GFR Estimate      >60 mL/min/1.73m2 82    Calcium      8.8 - 10.2 mg/dL 9.0    Glucose      70 - 99 mg/dL 337 (H)    Hemoglobin      11.7 - 15.7 g/dL 12.0    N-Terminal Pro Bnp      0 - 900 pg/mL 911 (H)    TSH      0.30 - 4.20 uIU/mL 2.61      [facility] Cardiology Refill Guideline reviewed.  Medication meets criteria for refill.  Zimride message sent to pt updating her that Entresto Rx sent x 3 months.     Medication Refilled: Entresto 24-26 mg BID (x 3 months)    Future Appointments   Date Time Provider Department Center   3/1/2024  1:30 PM Tiago Brewster PA-C [facility] PSA CLIN           Ronda Quan RN BSN  [facility] Clinic Care Coordinator  [facility] [place], [place]  [phone]  02/07/24, 10:55 AM

## 2024-02-24 ENCOUNTER — HEALTH MAINTENANCE LETTER (OUTPATIENT)
Age: 64
End: 2024-02-24

## 2024-03-01 ENCOUNTER — VIRTUAL VISIT (OUTPATIENT)
Dept: CARDIOLOGY | Facility: CLINIC | Age: 64
End: 2024-03-01
Attending: PHYSICIAN ASSISTANT
Payer: COMMERCIAL

## 2024-03-01 VITALS — BODY MASS INDEX: 28.57 KG/M2 | HEIGHT: 66 IN

## 2024-03-01 DIAGNOSIS — I50.22 CHRONIC SYSTOLIC CONGESTIVE HEART FAILURE (H): ICD-10-CM

## 2024-03-01 PROCEDURE — 99214 OFFICE O/P EST MOD 30 MIN: CPT | Mod: 95 | Performed by: PHYSICIAN ASSISTANT

## 2024-03-01 NOTE — LETTER
3/1/2024    Cady Lay MD  [address]  [place] [address]    RE: Nighat DRAPER Amy       Dear Colleague,     I had the pleasure of seeing Nighat Reyes in the [facility].  -----------------------------------------------------------------------------------------------------------------------------    Primary Cardiologist: Dr. Sandoval    Reason for Video Visit: CORE enrollment     HPI:  Nighat is a 62-year-old female with past medical history notable for history of admission in 4/2021 with anterior STEMI (status post PCI of mid LAD that had thrombotic occlusion; she had residual high-grade RCA disease which was intervened upon on 5/3/2021, receiving 1 HASEEB to RPDA and 1 HASEEB to mid RCA; on aspirin, hypertension, hyperlipidemia, ischemic cardiomyopathy (NYHA class II, cardiac MRI during her admission in 4/2021 showed LVEF 44%; she is on metoprolol succinate 50 mg daily, spironolactone 25 mg, Entresto 24-26 mg BID, and Jardiance), and T2DM (on insulin as well; hA1c had improved ~10% to 7.0% but now up to ~9%).     Nighat is doing well from a cardiac standpoint. She has no side effects from Entresto. She was recently diagnosed with COVID and thus we are doing a virtual visit today.     ROS:  12-pt ROS is negative except for as noted above.     Physical Exam:  A limited exam was conducted via video.  Constitutional:  Patient is pleasant, alert, cooperative, and in NAD.  HEENT:  NCAT. EOM's intact.   Neck:  CVP appears normal.   Pulmonary: Normal respiratory effort.   Extremities: No edema, erythema, cyanosis appreciated.  Skin:  No rashes or lesions appreciated.   Neurological:  No gross motor or sensory deficits.   Psych: Appropriate affect.       A/P:   Nighat is a 62-year-old female with past medical history notable for history of admission in 4/2021 with anterior STEMI (status post PCI of mid LAD that had thrombotic occlusion; she had residual high-grade RCA disease which was intervened upon on  5/3/2021, receiving 1 HASEEB to RPDA and 1 HASEEB to mid RCA; on aspirin, hypertension, hyperlipidemia, ischemic cardiomyopathy (NYHA class II, cardiac MRI during her admission in 4/2021 showed LVEF 44%; she is on metoprolol succinate 50 mg daily, spironolactone 25 mg, Entresto 24-26 mg BID, and Jardiance), and T2DM (on insulin as well; hA1c had improved ~10% to 7.0% but now up to ~9%).     Nighat is fully optimized with her cardiac medications. We are not able to escalate medications further due to borderline BP. She has no symptoms to suggest hypervolemia. We will see her back in 6 months for follow up or sooner if she has concerns or questions.     Video start time: 1:40 PM  Video end time: 1:55 PM  Originating Location (pt. Location): home  Distant Location (provider location):  [facility]   Mode of Communication:  Video Conference via [employer] phone application       This visit is being conducted as a virtual visit due to the emphasis on mitigation of the COVID-19 virus pandemic. The clinician has decided that the risk of an in-office visit outweighs the benefit for this patient. The rest of the comprehensive physical examination is deferred due to public health emergency video visit restrictions.         Tiago Brewster PA-C   3/1/2024  Pager: [phone]      Thank you for allowing me to participate in the care of your patient.      Sincerely,     Tiago Brewster PA-C     [facility]  cc:   Tiago Brewster PA-C  [address] [place] [address]

## 2024-03-01 NOTE — PROGRESS NOTES
-----------------------------------------------------------------------------------------------------------------------------    Primary Cardiologist: Dr. Sandoval    Reason for Video Visit: CORE enrollment     HPI:  Nighat is a 62-year-old female with past medical history notable for history of admission in 4/2021 with anterior STEMI (status post PCI of mid LAD that had thrombotic occlusion; she had residual high-grade RCA disease which was intervened upon on 5/3/2021, receiving 1 HASEEB to RPDA and 1 HASEEB to mid RCA; on aspirin, hypertension, hyperlipidemia, ischemic cardiomyopathy (NYHA class II, cardiac MRI during her admission in 4/2021 showed LVEF 44%; she is on metoprolol succinate 50 mg daily, spironolactone 25 mg, Entresto 24-26 mg BID, and Jardiance), and T2DM (on insulin as well; hA1c had improved ~10% to 7.0% but now up to ~9%).     Nighat is doing well from a cardiac standpoint. She has no side effects from Entresto. She was recently diagnosed with COVID and thus we are doing a virtual visit today.     ROS:  12-pt ROS is negative except for as noted above.     Physical Exam:  A limited exam was conducted via video.  Constitutional:  Patient is pleasant, alert, cooperative, and in NAD.  HEENT:  NCAT. EOM's intact.   Neck:  CVP appears normal.   Pulmonary: Normal respiratory effort.   Extremities: No edema, erythema, cyanosis appreciated.  Skin:  No rashes or lesions appreciated.   Neurological:  No gross motor or sensory deficits.   Psych: Appropriate affect.       A/P:   Nighat is a 62-year-old female with past medical history notable for history of admission in 4/2021 with anterior STEMI (status post PCI of mid LAD that had thrombotic occlusion; she had residual high-grade RCA disease which was intervened upon on 5/3/2021, receiving 1 HASEEB to RPDA and 1 HASEEB to mid RCA; on aspirin, hypertension, hyperlipidemia, ischemic cardiomyopathy (NYHA class II, cardiac MRI during her admission in 4/2021 showed LVEF 44%; she is  on metoprolol succinate 50 mg daily, spironolactone 25 mg, Entresto 24-26 mg BID, and Jardiance), and T2DM (on insulin as well; hA1c had improved ~10% to 7.0% but now up to ~9%).     Nighat is fully optimized with her cardiac medications. We are not able to escalate medications further due to borderline BP. She has no symptoms to suggest hypervolemia. We will see her back in 6 months for follow up or sooner if she has concerns or questions.     Video start time: 1:40 PM  Video end time: 1:55 PM  Originating Location (pt. Location): home  Distant Location (provider location):  [facility]   Mode of Communication:  Video Conference via [employer] phone application       This visit is being conducted as a virtual visit due to the emphasis on mitigation of the COVID-19 virus pandemic. The clinician has decided that the risk of an in-office visit outweighs the benefit for this patient. The rest of the comprehensive physical examination is deferred due to public health emergency video visit restrictions.         Tiago Brewster PA-C   3/1/2024  Pager: [phone]

## 2024-05-04 ENCOUNTER — HEALTH MAINTENANCE LETTER (OUTPATIENT)
Age: 64
End: 2024-05-04

## 2024-05-10 ENCOUNTER — MYC REFILL (OUTPATIENT)
Dept: CARDIOLOGY | Facility: CLINIC | Age: 64
End: 2024-05-10
Payer: COMMERCIAL

## 2024-05-10 DIAGNOSIS — I50.22 CHRONIC SYSTOLIC CONGESTIVE HEART FAILURE (H): ICD-10-CM

## 2024-05-13 RX ORDER — SACUBITRIL AND VALSARTAN 24; 26 MG/1; MG/1
1 TABLET, FILM COATED ORAL 2 TIMES DAILY
Qty: 180 TABLET | Refills: 0 | Status: SHIPPED | OUTPATIENT
Start: 2024-05-13 | End: 2024-08-11

## 2024-07-13 ENCOUNTER — HEALTH MAINTENANCE LETTER (OUTPATIENT)
Age: 64
End: 2024-07-13

## 2024-07-18 ENCOUNTER — DOCUMENTATION ONLY (OUTPATIENT)
Dept: LAB | Facility: CLINIC | Age: 64
End: 2024-07-18
Payer: COMMERCIAL

## 2024-07-18 DIAGNOSIS — I50.22 CHRONIC SYSTOLIC CONGESTIVE HEART FAILURE (H): Primary | ICD-10-CM

## 2024-07-18 NOTE — PROGRESS NOTES
Nighat Reyes has an upcoming lab appointment:    Future Appointments   Date Time Provider Department Center   8/1/2024 11:45 AM RI LAB [facility]     Date change or need permission to draw early.    There is no order available. Please review and place either future orders or HMPO (Review of Health Maintenance Protocol Orders), as appropriate.    Health Maintenance Due   Topic    MICROALBUMIN     ANNUAL REVIEW OF HM ORDERS     HEPATITIS C SCREENING     A1C     CBC     ALT     LIPID     BMP      Camden Romeo

## 2024-08-01 ENCOUNTER — LAB (OUTPATIENT)
Dept: LAB | Facility: CLINIC | Age: 64
End: 2024-08-01
Payer: COMMERCIAL

## 2024-08-01 DIAGNOSIS — I50.22 CHRONIC SYSTOLIC CONGESTIVE HEART FAILURE (H): ICD-10-CM

## 2024-08-01 LAB
ANION GAP SERPL CALCULATED.3IONS-SCNC: 10 MMOL/L (ref 7–15)
BUN SERPL-MCNC: 10.4 MG/DL (ref 8–23)
CALCIUM SERPL-MCNC: 9 MG/DL (ref 8.8–10.4)
CHLORIDE SERPL-SCNC: 102 MMOL/L (ref 98–107)
CREAT SERPL-MCNC: 0.7 MG/DL (ref 0.51–0.95)
EGFRCR SERPLBLD CKD-EPI 2021: >90 ML/MIN/1.73M2
GLUCOSE SERPL-MCNC: 86 MG/DL (ref 70–99)
HCO3 SERPL-SCNC: 23 MMOL/L (ref 22–29)
POTASSIUM SERPL-SCNC: 5 MMOL/L (ref 3.4–5.3)
SODIUM SERPL-SCNC: 135 MMOL/L (ref 135–145)

## 2024-08-01 PROCEDURE — 36415 COLL VENOUS BLD VENIPUNCTURE: CPT | Performed by: PHYSICIAN ASSISTANT

## 2024-08-01 PROCEDURE — 80048 BASIC METABOLIC PNL TOTAL CA: CPT | Performed by: PHYSICIAN ASSISTANT

## 2024-08-02 ENCOUNTER — TELEPHONE (OUTPATIENT)
Dept: CARDIOLOGY | Facility: CLINIC | Age: 64
End: 2024-08-02
Payer: COMMERCIAL

## 2024-08-11 ENCOUNTER — MYC REFILL (OUTPATIENT)
Dept: CARDIOLOGY | Facility: CLINIC | Age: 64
End: 2024-08-11
Payer: COMMERCIAL

## 2024-08-11 DIAGNOSIS — I50.22 CHRONIC SYSTOLIC CONGESTIVE HEART FAILURE (H): ICD-10-CM

## 2024-08-12 RX ORDER — SACUBITRIL AND VALSARTAN 24; 26 MG/1; MG/1
1 TABLET, FILM COATED ORAL 2 TIMES DAILY
Qty: 180 TABLET | Refills: 1 | Status: SHIPPED | OUTPATIENT
Start: 2024-08-12

## 2024-08-12 NOTE — TELEPHONE ENCOUNTER
[facility] - [facility] Cardiology Refill Guideline reviewed.  Medication meets criteria for refill.      Future Appointments   Date Time Provider Department Center   11/13/2024 10:00 AM Tiago Brewster PA-C [facility] CLIN     DERICK EdwardsN, RN 2:39 PM 08/12/24

## 2024-11-08 ENCOUNTER — PATIENT OUTREACH (OUTPATIENT)
Dept: CARDIOLOGY | Facility: CLINIC | Age: 64
End: 2024-11-08
Payer: COMMERCIAL

## 2024-11-08 ENCOUNTER — TELEPHONE (OUTPATIENT)
Dept: CARDIOLOGY | Facility: CLINIC | Age: 64
End: 2024-11-08
Payer: COMMERCIAL

## 2024-11-08 DIAGNOSIS — I50.22 CHRONIC SYSTOLIC CONGESTIVE HEART FAILURE (H): Primary | ICD-10-CM

## 2024-11-08 NOTE — TELEPHONE ENCOUNTER
[facility] - [facility]     Orders placed for CORE AISHWARYA follow-up on 11/13/24.    Message to  scheduling to arrange BMP prior      CORE Intro letter through My Chart      Future Appointments   Date Time Provider Department Center   11/13/2024 10:00 AM [facility]         MARYANA Edwards, RN 9:36 AM 11/08/24

## 2024-11-08 NOTE — TELEPHONE ENCOUNTER
1st attempt- Left voicemail for the patient to call back and schedule the following:    Appointment type:  Testing only- No clinic visit  Provider:  Tiago Mariee  Return date:  11/13/24  Additional appointment(s) needed:  Labs (BMP)  Additonal Notes:  -  Specialty phone number: [phone]

## 2024-11-08 NOTE — LETTER
[facility] - HEART FAILURE CARE COORDINATION  [address] [address]  [facility] [address]    November 8, 2024    Nighat Reyes  [address] [place] [address]      Dear Nighat,    I am a heart failure care coordinator nurse who works with the doctors and staff at the Cardiology/C.O.R.E. clinic to help manage patient care. I am here to answer any questions or concerns about your heart health.    My goals are to help you manage your heart failure and to improve your access to care. I will work with your Cardiology team to support your health and social needs.     We will:    Create a care plan that supports communication between you and your Cardiology team.  Help you connect with health care resources.   Give you the information and knowledge you may need to manage your heart failure.    Work with you to remove any barriers you may have to your heart health.     We work to give you the highest quality healthcare. Please call me at [phone]  with any questions about your heart failure care.    Sincerely,        [facility] Cardiology- Heart Failure  C.O.R.E. Clinic Care Coordination Nurse Team  Phone: [phone]  Fax: [phone]

## 2024-11-13 ENCOUNTER — OFFICE VISIT (OUTPATIENT)
Dept: CARDIOLOGY | Facility: CLINIC | Age: 64
End: 2024-11-13
Attending: PHYSICIAN ASSISTANT
Payer: COMMERCIAL

## 2024-11-13 ENCOUNTER — LAB (OUTPATIENT)
Dept: LAB | Facility: CLINIC | Age: 64
End: 2024-11-13
Payer: COMMERCIAL

## 2024-11-13 VITALS
BODY MASS INDEX: 27.97 KG/M2 | DIASTOLIC BLOOD PRESSURE: 77 MMHG | HEIGHT: 66 IN | SYSTOLIC BLOOD PRESSURE: 117 MMHG | HEART RATE: 77 BPM | OXYGEN SATURATION: 99 % | WEIGHT: 174 LBS

## 2024-11-13 DIAGNOSIS — I50.22 CHRONIC SYSTOLIC CONGESTIVE HEART FAILURE (H): ICD-10-CM

## 2024-11-13 LAB
ANION GAP SERPL CALCULATED.3IONS-SCNC: 13 MMOL/L (ref 7–15)
BUN SERPL-MCNC: 19.1 MG/DL (ref 8–23)
CALCIUM SERPL-MCNC: 9.1 MG/DL (ref 8.8–10.4)
CHLORIDE SERPL-SCNC: 100 MMOL/L (ref 98–107)
CREAT SERPL-MCNC: 0.94 MG/DL (ref 0.51–0.95)
EGFRCR SERPLBLD CKD-EPI 2021: 67 ML/MIN/1.73M2
GLUCOSE SERPL-MCNC: 102 MG/DL (ref 70–99)
HCO3 SERPL-SCNC: 22 MMOL/L (ref 22–29)
POTASSIUM SERPL-SCNC: 4.4 MMOL/L (ref 3.4–5.3)
SODIUM SERPL-SCNC: 135 MMOL/L (ref 135–145)

## 2024-11-13 RX ORDER — DAPAGLIFLOZIN 10 MG/1
10 TABLET, FILM COATED ORAL DAILY
COMMUNITY

## 2024-11-13 NOTE — PROGRESS NOTES
Primary Cardiologist: Dr. Sandoval     Reason for Visit: CORE follow up    History of Present Illness:   Nighat is a 64-year-old female with past medical history notable for history of admission in 4/2021 with anterior STEMI (s/p PCI of mid LAD that had thrombotic occlusion; she had residual high-grade RCA disease which was intervened upon on 5/3/2021, receiving 1 HASEEB to RPDA and 1 HASEEB to mid RCA; on aspirin, hypertension, hyperlipidemia, ischemic cardiomyopathy (NYHA class II, cardiac MRI during her admission in 4/2021 showed LVEF 44%; she is on metoprolol succinate 50 mg daily, spironolactone 25 mg, Entresto 24-26 mg BID, and Farxiga), and T2DM (on insulin as well; changed to farxiga due to cost).     Nighat returns to clinic stating she is doing well. She denies any new issues or concerns. She has brief positional lightheadedness but that has been chronic. She denies orthopnea, PND, peripheral edema, CP, lightheadedness, or syncope.    Assessment and Plan:  Nighat is a 64-year-old female with past medical history notable for history of admission in 4/2021 with anterior STEMI (s/p PCI of mid LAD that had thrombotic occlusion; she had residual high-grade RCA disease which was intervened upon on 5/3/2021, receiving 1 HASEEB to RPDA and 1 HASEEB to mid RCA; on aspirin, hypertension, hyperlipidemia, ischemic cardiomyopathy (NYHA class II, cardiac MRI during her admission in 4/2021 showed LVEF 44%; she is on metoprolol succinate 50 mg daily, spironolactone 25 mg, Entresto 24-26 mg BID, and Farxiga), and T2DM (on insulin as well; changed to farxiga due to cost).     Nighat is doing well from a cardiac standpoint. BMP shows normal renal function and stable electrolytes. We will see her back in 1 year.     50 minutes spent on the date of the encounter with chart review, patient visit, care coordination, and documentation.    The longitudinal plan of care for the diagnose(s)/condition(s) as documented were addressed during this visit. Due  to the added complexity in care, I will continue to support Nighat Reyes  in the subsequent management and with ongoing continuity of care.     This note was completed in part using Dragon voice recognition software. Although reviewed after completion, some word and grammatical errors may occur.    Orders this Visit:  No orders of the defined types were placed in this encounter.    Orders Placed This Encounter   Medications    dapagliflozin (FARXIGA) 10 MG TABS tablet     Sig: Take 10 mg by mouth daily.     There are no discontinued medications.      Encounter Diagnosis   Name Primary?    Chronic systolic congestive heart failure (H)        CURRENT MEDICATIONS:  Current Outpatient Medications   Medication Sig Dispense Refill    aspirin (ASA) 81 MG EC tablet Take 1 tablet (81 mg) by mouth daily 90 tablet 3    atorvastatin (LIPITOR) 40 MG tablet Take 1 tablet (40 mg) by mouth every evening 90 tablet 3    dapagliflozin (FARXIGA) 10 MG TABS tablet Take 10 mg by mouth daily.      insulin glargine (BASAGLAR KWIKPEN) 100 UNIT/ML pen Inject 18 Units subcutaneously at bedtime.      metFORMIN (GLUCOPHAGE) 1000 MG tablet Take 1,000 mg by mouth 2 times daily (with meals)      metoprolol succinate ER (TOPROL-XL) 50 MG 24 hr tablet Take 1 tablet (50 mg) by mouth daily 90 tablet 0    nitroGLYcerin (NITROSTAT) 0.4 MG sublingual tablet For chest pain place 1 tablet under the tongue every 5 minutes for 3 doses. If symptoms persist 5 minutes after 1st dose call 911. 30 tablet 3    omeprazole (PRILOSEC) 20 MG DR capsule Take 1 capsule (20 mg) by mouth daily 90 capsule 3    sacubitril-valsartan (ENTRESTO) 24-26 MG per tablet Take 1 tablet by mouth 2 times daily 180 tablet 1    sitagliptin (JANUVIA) 100 MG tablet Take 100 mg by mouth daily      spironolactone (ALDACTONE) 25 MG tablet Take half tablet (12.5 mg) every morning. 30 tablet 3    empagliflozin (JARDIANCE) 10 MG TABS tablet Take 10 mg by mouth         ALLERGIES     Allergies    Allergen Reactions    Lisinopril        PAST MEDICAL HISTORY:  Past Medical History:   Diagnosis Date    Chronic systolic congestive heart failure (H) 07/25/2023    Diabetes mellitus, type II (H) 04/14/2006    Uncontrolled    Essential hypertension 04/14/2006    Hyperlipidemia 04/14/2006    Ischemic cardiomyopathy     Ischemic cardiomyopathy 07/25/2023    Patent ductus arteriosus     s/p surgical repair    ST elevation MI (STEMI) (H) 04/04/2021    s/p HASEEB to mid LAD    ST elevation myocardial infarction involving left anterior descending (LAD) coronary artery (H) 04/04/2021    Tobacco use        PAST SURGICAL HISTORY:  Past Surgical History:   Procedure Laterality Date    CV CORONARY ANGIOGRAM N/A 4/4/2021    Procedure: Coronary Angiogram;  Surgeon: Yusuf Maldonado MD;  Location: [facility] CARDIAC CATH LAB    CV HEART CATHETERIZATION WITH POSSIBLE INTERVENTION N/A 5/3/2021    Procedure: Heart Catheterization with Possible Intervention;  Surgeon: Alli Sandoval MD;  Location: [facility] CARDIAC CATH LAB    CV PCI STENT DRUG ELUTING N/A 4/4/2021    Procedure: Percutaneous Coronary Intervention Stent Drug Eluting;  Surgeon: Yusuf Maldonado MD;  Location: [facility] CARDIAC CATH LAB       FAMILY HISTORY:  Family History   Problem Relation Age of Onset    No Known Problems Mother     No Known Problems Father     No Known Problems Brother     No Known Problems Sister     Hypertension Son        SOCIAL HISTORY:  Social History     Socioeconomic History    Marital status:      Spouse name: None    Number of children: None    Years of education: None    Highest education level: None   Tobacco Use    Smoking status: Never    Smokeless tobacco: Never   Substance and Sexual Activity    Alcohol use: Yes     Comment: occ    Drug use: Never     Social Drivers of Health     Financial Resource Strain: Low Risk  (1/2/2024)    Received from [facility], [facility]    Financial Resource Strain     Difficulty of Paying Living Expenses: 3   Food Insecurity: No Food Insecurity (1/2/2024)    Received from [facility]    Food Insecurity     Do you worry your food will run out before you are able to buy more?: 1   Transportation Needs: No Transportation Needs (1/2/2024)    Received from [facility]    Transportation Needs     Does lack of transportation keep you from medical appointments?: 1     Does lack of transportation keep you from work, meetings or getting things that you need?: 1   Social Connections: Socially Integrated (1/2/2024)    Received from [facility]    Social Connections     Do you often feel lonely or isolated from those around you?: 0   Housing Stability: Low Risk  (1/2/2024)    Received from [facility]    Housing Stability     What is your housing situation today?: 1       Review of Systems:  Skin:  not assessed     Eyes:  not assessed    ENT:  not assessed    Respiratory:  Positive for cough  Cardiovascular:    Positive for;fatigue  Gastroenterology: not assessed    Genitourinary:  not assessed    Musculoskeletal:  not assessed    Neurologic:  not assessed    Psychiatric:  not assessed    Heme/Lymph/Imm:  not assessed    Endocrine:  not assessed      Physical Exam:  Vitals: /77 (BP Location: Right arm, Patient Position: Sitting, Cuff Size: Adult Regular)   Pulse 77   Ht 1.676 m (5' 6\")   Wt 78.9 kg (174 lb)   SpO2 99%   BMI 28.08 kg/m       GEN:  NAD  NECK: No JVD  C/V:  Regular rate and rhythm, no murmur, rub or gallop.  RESP: Clear to auscultation bilaterally without wheezing, rales, or rhonchi.  GI: Abdomen soft, nontender, nondistended.   EXTREM: No pitting LE edema.   NEURO: Alert and oriented, cooperative. No obvious focal deficits.   PSYCH: Normal affect.  SKIN: Warm and dry.       Recent Lab " Results:  LIPID RESULTS:  Lab Results   Component Value Date    CHOL 136 06/21/2023    CHOL 120 07/12/2021    HDL 34 (L) 06/21/2023    HDL 43 07/12/2021    LDL 56 06/21/2023    LDL 59 07/12/2021     (H) 04/04/2021    TRIG 231 (H) 06/21/2023    TRIG 91 07/12/2021       LIVER ENZYME RESULTS:  Lab Results   Component Value Date    AST 44 04/07/2021    ALT 20 06/21/2023    ALT 29 04/07/2021       CBC RESULTS:  Lab Results   Component Value Date    WBC 5.5 05/03/2021    RBC 4.05 05/03/2021    HGB 12.0 02/07/2024    HGB 12.2 05/03/2021    HCT 37.8 05/03/2021    MCV 93 05/03/2021    MCH 30.1 05/03/2021    MCHC 32.3 05/03/2021    RDW 13.8 05/03/2021     05/03/2021       BMP RESULTS:  Lab Results   Component Value Date     11/13/2024     07/12/2021    POTASSIUM 4.4 11/13/2024    POTASSIUM 4.3 08/11/2022    POTASSIUM 4.6 07/12/2021    CHLORIDE 100 11/13/2024    CHLORIDE 107 08/11/2022    CHLORIDE 102 07/12/2021    CO2 22 11/13/2024    CO2 28 08/11/2022    CO2 22 07/12/2021    ANIONGAP 13 11/13/2024    ANIONGAP 3 08/11/2022    ANIONGAP 5 05/03/2021     (H) 11/13/2024     (H) 08/11/2022     (A) 07/12/2021    BUN 19.1 11/13/2024    BUN 12 08/11/2022    BUN 9 07/12/2021    CR 0.94 11/13/2024    CR 0.79 07/12/2021    GFRESTIMATED 67 11/13/2024    GFRESTIMATED 87 05/03/2021    GFRESTBLACK >90 05/03/2021    LEYLA 9.1 11/13/2024    LEYLA 9.5 07/12/2021        A1C RESULTS:  Lab Results   Component Value Date    A1C 10.9 (H) 04/04/2021       INR RESULTS:  Lab Results   Component Value Date    INR 0.98 05/03/2021    INR 1.19 (H) 04/07/2021             Tiago Brewster PA-C  November 13, 2024

## 2024-11-13 NOTE — PATIENT INSTRUCTIONS
Please, remember to continue the followin.  Weigh yourself daily. Call if your weight is up > than 2 pounds in one day, or 5 pounds in one week; if you feel more short of breath or have worsening swelling in your legs or abdomen.  2.  Eat a low sodium diet (less than 2,000mg or 2g daily). If you eat less salt, you will retain less fluid.   3.  Avoid alcohol, as this can worsen heart failure.   4.  Avoid NSAIDs as able (For example, Ibuprofen / aleve / advil / naprosen / diclofenac).    Please call CORE nurse for any questions or concerns Mon-Fri 8am-4pm:   [phone]  For concerns after hours:   [phone] option 2   Scheduling phone number:   [phone]    Thank you for visiting with us today.   Tiago Brewster PA-C  ______________________________________________________________

## 2024-11-13 NOTE — LETTER
11/13/2024    Cady Lay MD  [address]  [place] [address]    RE: Nighat Reyes       Dear Colleague,     I had the pleasure of seeing Nighat Reyes in the [facility].  Primary Cardiologist: Dr. Sandoval     Reason for Visit: CORE follow up    History of Present Illness:   Nighat is a 64-year-old female with past medical history notable for history of admission in 4/2021 with anterior STEMI (s/p PCI of mid LAD that had thrombotic occlusion; she had residual high-grade RCA disease which was intervened upon on 5/3/2021, receiving 1 HASEEB to RPDA and 1 HASEEB to mid RCA; on aspirin, hypertension, hyperlipidemia, ischemic cardiomyopathy (NYHA class II, cardiac MRI during her admission in 4/2021 showed LVEF 44%; she is on metoprolol succinate 50 mg daily, spironolactone 25 mg, Entresto 24-26 mg BID, and Farxiga), and T2DM (on insulin as well; changed to farxiga due to cost).     Nighat returns to clinic stating she is doing well. She denies any new issues or concerns. She has brief positional lightheadedness but that has been chronic. She denies orthopnea, PND, peripheral edema, CP, lightheadedness, or syncope.    Assessment and Plan:  Nighat is a 64-year-old female with past medical history notable for history of admission in 4/2021 with anterior STEMI (s/p PCI of mid LAD that had thrombotic occlusion; she had residual high-grade RCA disease which was intervened upon on 5/3/2021, receiving 1 HASEEB to RPDA and 1 HASEEB to mid RCA; on aspirin, hypertension, hyperlipidemia, ischemic cardiomyopathy (NYHA class II, cardiac MRI during her admission in 4/2021 showed LVEF 44%; she is on metoprolol succinate 50 mg daily, spironolactone 25 mg, Entresto 24-26 mg BID, and Farxiga), and T2DM (on insulin as well; changed to farxiga due to cost).     Nighat is doing well from a cardiac standpoint. BMP shows normal renal function and stable electrolytes. We will see her back in 1 year.     50 minutes spent on the date of  the encounter with chart review, patient visit, care coordination, and documentation.    The longitudinal plan of care for the diagnose(s)/condition(s) as documented were addressed during this visit. Due to the added complexity in care, I will continue to support Nighat Reyes  in the subsequent management and with ongoing continuity of care.     This note was completed in part using Dragon voice recognition software. Although reviewed after completion, some word and grammatical errors may occur.    Orders this Visit:  No orders of the defined types were placed in this encounter.    Orders Placed This Encounter   Medications     dapagliflozin (FARXIGA) 10 MG TABS tablet     Sig: Take 10 mg by mouth daily.     There are no discontinued medications.      Encounter Diagnosis   Name Primary?     Chronic systolic congestive heart failure (H)        CURRENT MEDICATIONS:  Current Outpatient Medications   Medication Sig Dispense Refill     aspirin (ASA) 81 MG EC tablet Take 1 tablet (81 mg) by mouth daily 90 tablet 3     atorvastatin (LIPITOR) 40 MG tablet Take 1 tablet (40 mg) by mouth every evening 90 tablet 3     dapagliflozin (FARXIGA) 10 MG TABS tablet Take 10 mg by mouth daily.       insulin glargine (BASAGLAR KWIKPEN) 100 UNIT/ML pen Inject 18 Units subcutaneously at bedtime.       metFORMIN (GLUCOPHAGE) 1000 MG tablet Take 1,000 mg by mouth 2 times daily (with meals)       metoprolol succinate ER (TOPROL-XL) 50 MG 24 hr tablet Take 1 tablet (50 mg) by mouth daily 90 tablet 0     nitroGLYcerin (NITROSTAT) 0.4 MG sublingual tablet For chest pain place 1 tablet under the tongue every 5 minutes for 3 doses. If symptoms persist 5 minutes after 1st dose call 911. 30 tablet 3     omeprazole (PRILOSEC) 20 MG DR capsule Take 1 capsule (20 mg) by mouth daily 90 capsule 3     sacubitril-valsartan (ENTRESTO) 24-26 MG per tablet Take 1 tablet by mouth 2 times daily 180 tablet 1     sitagliptin (JANUVIA) 100 MG tablet Take 100 mg  by mouth daily       spironolactone (ALDACTONE) 25 MG tablet Take half tablet (12.5 mg) every morning. 30 tablet 3     empagliflozin (JARDIANCE) 10 MG TABS tablet Take 10 mg by mouth         ALLERGIES     Allergies   Allergen Reactions     Lisinopril        PAST MEDICAL HISTORY:  Past Medical History:   Diagnosis Date     Chronic systolic congestive heart failure (H) 07/25/2023     Diabetes mellitus, type II (H) 04/14/2006    Uncontrolled     Essential hypertension 04/14/2006     Hyperlipidemia 04/14/2006     Ischemic cardiomyopathy      Ischemic cardiomyopathy 07/25/2023     Patent ductus arteriosus     s/p surgical repair     ST elevation MI (STEMI) (H) 04/04/2021    s/p HASEEB to mid LAD     ST elevation myocardial infarction involving left anterior descending (LAD) coronary artery (H) 04/04/2021     Tobacco use        PAST SURGICAL HISTORY:  Past Surgical History:   Procedure Laterality Date     CV CORONARY ANGIOGRAM N/A 4/4/2021    Procedure: Coronary Angiogram;  Surgeon: Yusuf Maldonado MD;  Location: [facility] CARDIAC CATH LAB     CV HEART CATHETERIZATION WITH POSSIBLE INTERVENTION N/A 5/3/2021    Procedure: Heart Catheterization with Possible Intervention;  Surgeon: Alli Sandoval MD;  Location: [facility] CARDIAC CATH LAB     CV PCI STENT DRUG ELUTING N/A 4/4/2021    Procedure: Percutaneous Coronary Intervention Stent Drug Eluting;  Surgeon: Yusuf Maldonado MD;  Location: [facility] CARDIAC CATH LAB       FAMILY HISTORY:  Family History   Problem Relation Age of Onset     No Known Problems Mother      No Known Problems Father      No Known Problems Brother      No Known Problems Sister      Hypertension Son        SOCIAL HISTORY:  Social History     Socioeconomic History     Marital status:      Spouse name: None     Number of children: None     Years of education: None     Highest education level: None   Tobacco Use     Smoking status: Never     Smokeless tobacco: Never   Substance and  "Sexual Activity     Alcohol use: Yes     Comment: occ     Drug use: Never     Social Drivers of Health     Financial Resource Strain: Low Risk  (1/2/2024)    [facility]    Financial Resource Strain      Difficulty of Paying Living Expenses: 3   Food Insecurity: No Food Insecurity (1/2/2024)    Received from [facility]    Food Insecurity      Do you worry your food will run out before you are able to buy more?: 1   Transportation Needs: No Transportation Needs (1/2/2024)    Received from [facility]    Transportation Needs      Does lack of transportation keep you from medical appointments?: 1      Does lack of transportation keep you from work, meetings or getting things that you need?: 1   Social Connections: Socially Integrated (1/2/2024)    Received from [facility]    Social Connections      Do you often feel lonely or isolated from those around you?: 0   Housing Stability: Low Risk  (1/2/2024)    Received from [facility]    Housing Stability      What is your housing situation today?: 1       Review of Systems:  Skin:  not assessed     Eyes:  not assessed    ENT:  not assessed    Respiratory:  Positive for cough  Cardiovascular:    Positive for;fatigue  Gastroenterology: not assessed    Genitourinary:  not assessed    Musculoskeletal:  not assessed    Neurologic:  not assessed    Psychiatric:  not assessed    Heme/Lymph/Imm:  not assessed    Endocrine:  not assessed      Physical Exam:  Vitals: /77 (BP Location: Right arm, Patient Position: Sitting, Cuff Size: Adult Regular)   Pulse 77   Ht 1.676 m (5' 6\")   Wt 78.9 kg (174 lb)   SpO2 99%   BMI 28.08 kg/m       GEN:  NAD  NECK: No JVD  C/V:  Regular rate and rhythm, no murmur, rub or gallop.  RESP: Clear to auscultation " bilaterally without wheezing, rales, or rhonchi.  GI: Abdomen soft, nontender, nondistended.   EXTREM: No pitting LE edema.   NEURO: Alert and oriented, cooperative. No obvious focal deficits.   PSYCH: Normal affect.  SKIN: Warm and dry.       Recent Lab Results:  LIPID RESULTS:  Lab Results   Component Value Date    CHOL 136 06/21/2023    CHOL 120 07/12/2021    HDL 34 (L) 06/21/2023    HDL 43 07/12/2021    LDL 56 06/21/2023    LDL 59 07/12/2021     (H) 04/04/2021    TRIG 231 (H) 06/21/2023    TRIG 91 07/12/2021       LIVER ENZYME RESULTS:  Lab Results   Component Value Date    AST 44 04/07/2021    ALT 20 06/21/2023    ALT 29 04/07/2021       CBC RESULTS:  Lab Results   Component Value Date    WBC 5.5 05/03/2021    RBC 4.05 05/03/2021    HGB 12.0 02/07/2024    HGB 12.2 05/03/2021    HCT 37.8 05/03/2021    MCV 93 05/03/2021    MCH 30.1 05/03/2021    MCHC 32.3 05/03/2021    RDW 13.8 05/03/2021     05/03/2021       BMP RESULTS:  Lab Results   Component Value Date     11/13/2024     07/12/2021    POTASSIUM 4.4 11/13/2024    POTASSIUM 4.3 08/11/2022    POTASSIUM 4.6 07/12/2021    CHLORIDE 100 11/13/2024    CHLORIDE 107 08/11/2022    CHLORIDE 102 07/12/2021    CO2 22 11/13/2024    CO2 28 08/11/2022    CO2 22 07/12/2021    ANIONGAP 13 11/13/2024    ANIONGAP 3 08/11/2022    ANIONGAP 5 05/03/2021     (H) 11/13/2024     (H) 08/11/2022     (A) 07/12/2021    BUN 19.1 11/13/2024    BUN 12 08/11/2022    BUN 9 07/12/2021    CR 0.94 11/13/2024    CR 0.79 07/12/2021    GFRESTIMATED 67 11/13/2024    GFRESTIMATED 87 05/03/2021    GFRESTBLACK >90 05/03/2021    LEYLA 9.1 11/13/2024    LEYLA 9.5 07/12/2021        A1C RESULTS:  Lab Results   Component Value Date    A1C 10.9 (H) 04/04/2021       INR RESULTS:  Lab Results   Component Value Date    INR 0.98 05/03/2021    INR 1.19 (H) 04/07/2021             Tiago Brewster PA-C  November 13, 2024       Thank you for allowing me to participate  in the care of your patient.      Sincerely,     Tiago Brewster PA-C     [facility]  cc:   Tiago Brewster PA-C  [address]  [place] [address]

## 2024-11-24 ENCOUNTER — HEALTH MAINTENANCE LETTER (OUTPATIENT)
Age: 64
End: 2024-11-24

## 2025-03-09 ENCOUNTER — HEALTH MAINTENANCE LETTER (OUTPATIENT)
Age: 65
End: 2025-03-09

## 2025-05-17 ENCOUNTER — HEALTH MAINTENANCE LETTER (OUTPATIENT)
Age: 65
End: 2025-05-17

## 2025-06-28 ENCOUNTER — HEALTH MAINTENANCE LETTER (OUTPATIENT)
Age: 65
End: 2025-06-28

## (undated) DEVICE — CATH BALLOON NC EMERGE 2.50X8MM H7493926708250

## (undated) DEVICE — CLOSURE ANGIOSEAL 6FR 610130

## (undated) DEVICE — KIT HAND CONTROL ACIST 014644 AR-P54

## (undated) DEVICE — WIRE GUIDE 0.035"X260CM SAFE-T-J EXCHANGE G00517

## (undated) DEVICE — CATH BALLOON EMERGE 2.5X12MM H7493918912250

## (undated) DEVICE — INTRO SHEATH 6FRX10CM PINNACLE RSS602

## (undated) DEVICE — TOTE ANGIO CORP PC15AT SAN32CC83O

## (undated) DEVICE — CATH GUIDING BLUE YELLOW PTFE XB3.5 6FRX100CM 67005400

## (undated) DEVICE — VALVE HEMOSTASIS .096" COPILOT MECH 1003331

## (undated) DEVICE — NDL PERC ENTRY 21GA 4CM G00280

## (undated) DEVICE — CATH ANGIO JUDKINS JL4 6FRX100CM INFINITI 534620T

## (undated) DEVICE — PACK HEART LEFT CUSTOM

## (undated) DEVICE — MANIFOLD KIT ANGIO AUTOMATED 014613

## (undated) DEVICE — CATH GUIDING BLUE YELLOW PTFE JR4 6FRX100CM 67008200

## (undated) DEVICE — CATH BALLOON EMERGE 2.0X12MM H7493918912200

## (undated) DEVICE — INFL DVC KIT W/10CC NITRO IN4530

## (undated) DEVICE — INTRO GLIDESHEATH SLENDER 6FR 10X45CM 60-1060

## (undated) DEVICE — CATH DIAGNOSTIC RADIAL 5FR TIG 4.0

## (undated) DEVICE — CATH LAUNCHER 6FR JR 4.0 LA6JR40

## (undated) DEVICE — DEFIB PRO-PADZ LVP LQD GEL ADULT 8900-2105-01

## (undated) DEVICE — INTRO SHEATH 6FRX25CM PINNACLE RSS606

## (undated) DEVICE — KIT ACCESSORY INTRO INFLATION SYS 20/30 PRIORITY 1000186-115

## (undated) DEVICE — WIRE GUIDE HI-TRQ PILOT 50 JTIP 0.014"X190CM 1010480-HJ

## (undated) DEVICE — GW MINAMO STRAIGHT 190CM

## (undated) DEVICE — CATH BALLOON NC EMERGE 2.75X15MM H7493926715270

## (undated) DEVICE — CATH EMBOLECTOMY ART 2LUM ASP 6FRX145CM 30ML 60090-01

## (undated) DEVICE — NDL PERC ENTRY THINWALL 18GA 7.0" G00166

## (undated) DEVICE — CATH ANGIO INFINITI 3DRC 6FRX100CM 534676T

## (undated) RX ORDER — FENTANYL CITRATE 50 UG/ML
INJECTION, SOLUTION INTRAMUSCULAR; INTRAVENOUS
Status: DISPENSED
Start: 2021-05-03

## (undated) RX ORDER — LIDOCAINE HYDROCHLORIDE 10 MG/ML
INJECTION, SOLUTION EPIDURAL; INFILTRATION; INTRACAUDAL; PERINEURAL
Status: DISPENSED
Start: 2021-05-03

## (undated) RX ORDER — NICARDIPINE HYDROCHLORIDE 2.5 MG/ML
INJECTION INTRAVENOUS
Status: DISPENSED
Start: 2021-04-04

## (undated) RX ORDER — HEPARIN SODIUM 200 [USP'U]/100ML
INJECTION, SOLUTION INTRAVENOUS
Status: DISPENSED
Start: 2021-05-03

## (undated) RX ORDER — VERAPAMIL HYDROCHLORIDE 2.5 MG/ML
INJECTION, SOLUTION INTRAVENOUS
Status: DISPENSED
Start: 2021-05-03

## (undated) RX ORDER — HEPARIN SODIUM 1000 [USP'U]/ML
INJECTION, SOLUTION INTRAVENOUS; SUBCUTANEOUS
Status: DISPENSED
Start: 2021-05-03

## (undated) RX ORDER — NITROGLYCERIN 5 MG/ML
VIAL (ML) INTRAVENOUS
Status: DISPENSED
Start: 2021-05-03